# Patient Record
Sex: FEMALE | Race: WHITE | Employment: UNEMPLOYED | URBAN - METROPOLITAN AREA
[De-identification: names, ages, dates, MRNs, and addresses within clinical notes are randomized per-mention and may not be internally consistent; named-entity substitution may affect disease eponyms.]

---

## 2018-08-20 ENCOUNTER — HOSPITAL ENCOUNTER (EMERGENCY)
Age: 58
Discharge: HOME | End: 2018-08-20
Attending: EMERGENCY MEDICINE
Payer: SELF-PAY

## 2018-08-20 VITALS
RESPIRATION RATE: 16 BRPM | OXYGEN SATURATION: 99 % | HEIGHT: 62 IN | SYSTOLIC BLOOD PRESSURE: 136 MMHG | HEART RATE: 94 BPM | WEIGHT: 153 LBS | TEMPERATURE: 98 F | DIASTOLIC BLOOD PRESSURE: 73 MMHG | BODY MASS INDEX: 28.16 KG/M2

## 2018-08-20 DIAGNOSIS — S61.213A LACERATION OF LEFT MIDDLE FINGER WITHOUT FOREIGN BODY WITHOUT DAMAGE TO NAIL, INITIAL ENCOUNTER: Primary | ICD-10-CM

## 2018-08-20 DIAGNOSIS — Z23 NEED FOR TDAP VACCINATION: ICD-10-CM

## 2018-08-20 PROCEDURE — 77030008323 HC SPLNT FNGR GTR DJOR -A

## 2018-08-20 PROCEDURE — 77030039266 HC ADH SKN EXOFIN S2SG -A

## 2018-08-20 PROCEDURE — 99283 EMERGENCY DEPT VISIT LOW MDM: CPT

## 2018-08-20 PROCEDURE — 74011250636 HC RX REV CODE- 250/636: Performed by: PHYSICIAN ASSISTANT

## 2018-08-20 PROCEDURE — 90715 TDAP VACCINE 7 YRS/> IM: CPT | Performed by: PHYSICIAN ASSISTANT

## 2018-08-20 PROCEDURE — 75810000293 HC SIMP/SUPERF WND  RPR

## 2018-08-20 RX ADMIN — TETANUS TOXOID, REDUCED DIPHTHERIA TOXOID AND ACELLULAR PERTUSSIS VACCINE, ADSORBED 0.5 ML: 5; 2.5; 8; 8; 2.5 SUSPENSION INTRAMUSCULAR at 17:43

## 2018-08-20 NOTE — ED PROVIDER NOTES
EMERGENCY DEPARTMENT HISTORY AND PHYSICAL EXAM    Date: 8/20/2018  Patient Name: Dottie Miller    History of Presenting Illness     Chief Complaint   Patient presents with    Laceration         History Provided By: Patient    Chief Complaint: Laceration  Duration: 30 Minutes  Timing:  Acute  Location: Left middle finger  Modifying Factors: No relieving or worsening factors  Associated Symptoms: Controlled bleeding    Additional History (Context):   5:06 PM  Dottie Miller is a 60 y.o. female with PMHX of asthma, DM, thyroid disease, and arthritis who presents to the emergency department C/O laceration to her left middle finger, onset just PTA while cutting fabric at home. Associated sxs include controlled bleeding. Last Tetanus was over 5 years ago. Pt denies numbness, and any other sxs or complaints. PCP: Lauro Meredith MD    Current Outpatient Prescriptions   Medication Sig Dispense Refill    glipiZIDE (GLUCOTROL) 10 mg tablet Take 10 mg by mouth daily.  levothyroxine (SYNTHROID) 100 mcg tablet Take 100 mcg by mouth Daily (before breakfast). Pt instructed to take with a small bit of water on DOS       lisinopril (PRINIVIL, ZESTRIL) 10 mg tablet Take 10 mg by mouth daily. Pt instructed to take with a small bit of water on DOS       PARoxetine (PAXIL) 20 mg tablet Take 20 mg by mouth daily. Indications: GENERALIZED ANXIETY DISORDER      simvastatin (ZOCOR) 40 mg tablet Take 40 mg by mouth nightly.  cloNIDine (CATAPRESS) 0.2 mg tablet Take 0.2 mg by mouth nightly.  insulin (HUMULIN 70/30) 100 unit/mL (70-30) injection 25 Units by SubCUTAneous route every morning. 30 units sq q evening after dinner.  aspirin delayed-release 81 mg tablet Take 81 mg by mouth daily.  vitamin c-vitamin e (CRANBERRY CONCENTRATE) Cap Take 3,600 mg by mouth daily.  vitamin E (AQUA GEMS) 400 unit capsule Take 800 Units by mouth daily.         vitamin E (AQUA GEMS) 400 unit capsule Take 400 Units by mouth nightly.  omega-3 fatty acids-vitamin e (FISH OIL) 1,000 mg Cap Take 2 Caps by mouth two (2) times a day.  albuterol (PROVENTIL HFA, VENTOLIN HFA) 90 mcg/actuation inhaler Take 2 Puffs by inhalation every four (4) hours as needed. Past History     Past Medical History:  Past Medical History:   Diagnosis Date    Arthritis     Asthma     Diabetes ([address]     Thyroid disease        Past Surgical History:  Past Surgical History:   Procedure Laterality Date    HX  SECTION      HX CHOLECYSTECTOMY      HX HYSTERECTOMY      JAMAL & BSO    HX ORTHOPAEDIC  2012    Right CTR    HX PELVIC LAPAROSCOPY      Lysis of Adhesions    HX TUBAL LIGATION         Family History:  History reviewed. No pertinent family history. Social History:  Social History   Substance Use Topics    Smoking status: Former Smoker     Quit date: 2007    Smokeless tobacco: None    Alcohol use No       Allergies: Allergies   Allergen Reactions    Adhesive Tape-Silicones Other (comments)     welps    Niacin Other (comments)     Flushing      Vicodin [Hydrocodone-Acetaminophen] Itching         Review of Systems   Review of Systems   Musculoskeletal: Negative for arthralgias and joint swelling. Skin: Positive for wound (Laceration to left middle finger). Neurological: Negative for weakness and numbness. Hematological: Does not bruise/bleed easily. All other systems reviewed and are negative. Physical Exam     Vitals:    18 1655   BP: 136/73   Pulse: 94   Resp: 16   Temp: 98 °F (36.7 °C)   SpO2: 99%   Weight: 69.4 kg (153 lb)   Height: 5' 2\" (1.575 m)     Physical Exam   Constitutional: She is oriented to person, place, and time. She appears well-developed and well-nourished. No distress.  female in NAD. Alert. Appears comfortable. HENT:   Head: Normocephalic and atraumatic.    Right Ear: External ear normal.   Left Ear: External ear normal.   Nose: Nose normal.   Eyes: Conjunctivae are normal.   Neck: Normal range of motion. Cardiovascular: Normal rate, regular rhythm, normal heart sounds and intact distal pulses. Exam reveals no gallop and no friction rub. No murmur heard. Pulses:       Radial pulses are 2+ on the right side, and 2+ on the left side. Pulmonary/Chest: Effort normal and breath sounds normal. No accessory muscle usage. No tachypnea. She has no decreased breath sounds. She has no rhonchi. Musculoskeletal: Normal range of motion. Left hand: She exhibits laceration. She exhibits normal range of motion, no tenderness, normal capillary refill and no swelling. Normal sensation noted. Normal strength noted. Hands:  Neurological: She is alert and oriented to person, place, and time. Skin: Skin is warm and dry. No rash noted. She is not diaphoretic. No erythema. Psychiatric: She has a normal mood and affect. Judgment normal.   Nursing note and vitals reviewed. Diagnostic Study Results     Labs -   No results found for this or any previous visit (from the past 12 hour(s)). Radiologic Studies -   No orders to display     CT Results  (Last 48 hours)    None        CXR Results  (Last 48 hours)    None          Medications given in the ED-  Medications   diph,Pertuss(AC),Tet Vac-PF (BOOSTRIX) suspension 0.5 mL (0.5 mL IntraMUSCular Given 8/20/18 1743)         Medical Decision Making   I am the first provider for this patient. I reviewed the vital signs, available nursing notes, past medical history, past surgical history, family history and social history. Vital Signs-Reviewed the patient's vital signs. Pulse Oximetry Analysis - 99% on RA     Records Reviewed: Nursing Notes and Old Medical Records    Provider Notes (Medical Decision Making): superficial lac. Appropriate for dermabond. NVI.      Procedures:  Wound Closure by Adhesive  Date/Time: 8/20/2018 5:45 PM  Performed by: Tc Clifton by: Patric Bill [address]     Consent:     Consent obtained:  Verbal    Consent given by:  Patient    Risks discussed:  Pain    Alternatives discussed:  No treatment  Anesthesia (see MAR for exact dosages): Anesthesia method:  None  Laceration details:     Location:  Finger    Finger location:  L long finger    Length (cm):  0.5    Laceration depth: superficial.  Repair type:     Repair type:  Simple  Exploration:     Hemostasis achieved with:  Direct pressure    Wound exploration: wound explored through full range of motion      Contaminated: no    Treatment:     Area cleansed with:  Saline    Amount of cleaning:  Standard    Irrigation solution:  Sterile saline    Irrigation method:  Pressure wash    Visualized foreign bodies/material removed: no    Skin repair:     Repair method:  Tissue adhesive  Approximation:     Approximation:  Close    Vermilion border: well-aligned    Post-procedure details:     Dressing:  Splint for protection    Patient tolerance of procedure: Tolerated well, no immediate complications        ED Course:   5:06 PM Initial assessment performed. The patients presenting problems have been discussed, and they are in agreement with the care plan formulated and outlined with them. I have encouraged them to ask questions as they arise throughout their visit. Diagnosis and Disposition     Cleaned. Dermabond. Tdap updated. Placed in splint to protect area. NVI. Reasons to RTED discussed with pt. All questions answered. Pt feels comfortable going home at this time. Pt expressed understanding and she agrees with plan. DISCHARGE NOTE:  5:40 PM  Milad Nolasco  results have been reviewed with her. She has been counseled regarding her diagnosis, treatment, and plan. She verbally conveys understanding and agreement of the signs, symptoms, diagnosis, treatment and prognosis and additionally agrees to follow up as discussed.   She also agrees with the care-plan and conveys that all of her questions have been answered. I have also provided discharge instructions for her that include: educational information regarding their diagnosis and treatment, and list of reasons why they would want to return to the ED prior to their follow-up appointment, should her condition change. She has been provided with education for proper emergency department utilization. CLINICAL IMPRESSION:    1. Laceration of left middle finger without foreign body without damage to nail, initial encounter    2. Need for Tdap vaccination        PLAN:  1. D/C Home  2. Discharge Medication List as of 8/20/2018  5:40 PM        3. Follow-up Information     Follow up With Details Comments [address] [place] [address] MD Serjio Schedule an appointment as soon as possible for a visit in 2 days For PCP follow up [facility] [address] [id number] [facility] EMERGENCY DEPT Go to As needed, If symptoms worsen [address] [address]  [phone]        _______________________________    Attestations: This note is prepared by Drew Ware, acting as Scribe for Denisa Mcrae PA-C. Denisa Mcrae PA-C:  The scribe's documentation has been prepared under my direction and personally reviewed by me in its entirety.   I confirm that the note above accurately reflects all work, treatment, procedures, and medical decision making performed by me.  _______________________________

## 2018-08-20 NOTE — DISCHARGE INSTRUCTIONS
Cuts: Care Instructions  Your Care Instructions  A cut can happen anywhere on your body. Stitches, staples, skin adhesives, or pieces of tape called Steri-Strips are sometimes used to keep the edges of a cut together and help it heal. Steri-Strips can be used by themselves or with stitches or staples. Sometimes cuts are left open. If the cut went deep and through the skin, the doctor may have closed the cut in two layers. A deeper layer of stitches brings the deep part of the cut together. These stitches will dissolve and don't need to be removed. The upper layer closure, which could be stitches, staples, Steri-Strips, or adhesive, is what you see on the cut. A cut is often covered by a bandage. The doctor has checked you carefully, but problems can develop later. If you notice any problems or new symptoms, get medical treatment right away. Follow-up care is a key part of your treatment and safety. Be sure to make and go to all appointments, and call your doctor if you are having problems. It's also a good idea to know your test results and keep a list of the medicines you take. How can you care for yourself at home? If a cut is open or closed  · Prop up the sore area on a pillow anytime you sit or lie down during the next 3 days. Try to keep it above the level of your heart. This will help reduce swelling. · Keep the cut dry for the first 24 to 48 hours. After this, you can shower if your doctor okays it. Pat the cut dry. · Don't soak the cut, such as in a bathtub. Your doctor will tell you when it's safe to get the cut wet. · After the first 24 to 48 hours, clean the cut with soap and water 2 times a day unless your doctor gives you different instructions. ¨ Don't use hydrogen peroxide or alcohol, which can slow healing. ¨ You may cover the cut with a thin layer of petroleum jelly and a nonstick bandage.   ¨ If the doctor put a bandage over the cut, put on a new bandage after cleaning the cut or if the bandage gets wet or dirty. · Avoid any activity that could cause your cut to reopen. · Be safe with medicines. Read and follow all instructions on the label. ¨ If the doctor gave you a prescription medicine for pain, take it as prescribed. ¨ If you are not taking a prescription pain medicine, ask your doctor if you can take an over-the-counter medicine. If the cut is closed with stitches, staples, or Steri-Strips  · Follow the above instructions for open or closed cuts. · Do not remove the stitches or staples on your own. Your doctor will tell you when to come back to have the stitches or staples removed. · Leave Steri-Strips on until they fall off. If the cut is closed with a skin adhesive  · Follow the above instructions for open or closed cuts. · Leave the skin adhesive on your skin until it falls off on its own. This may take 5 to 10 days. · Do not scratch, rub, or pick at the adhesive. · Do not put the sticky part of a bandage directly on the adhesive. · Do not put any kind of ointment, cream, or lotion over the area. This can make the adhesive fall off too soon. Do not use hydrogen peroxide or alcohol, which can slow healing. When should you call for help? Call your doctor now or seek immediate medical care if:    · You have new pain, or your pain gets worse.     · The skin near the cut is cold or pale or changes color.     · You have tingling, weakness, or numbness near the cut.     · The cut starts to bleed, and blood soaks through the bandage. Oozing small amounts of blood is normal.     · You have trouble moving the area near the cut.     · You have symptoms of infection, such as:  ¨ Increased pain, swelling, warmth, or redness around the cut. ¨ Red streaks leading from the cut. ¨ Pus draining from the cut. ¨ A fever.    Watch closely for changes in your health, and be sure to contact your doctor if:    · The cut reopens.     · You do not get better as expected.    Where can you learn more? Go to http://olu-brianna.info/. Enter M735 in the search box to learn more about \"Cuts: Care Instructions. \"  Current as of: November 20, 2017  Content Version: 11.7  © 9851-8361 [employer]. Care instructions adapted under license by [employer] (which disclaims liability or warranty for this information). If you have questions about a medical condition or this instruction, always ask your healthcare professional. [address] any warranty or liability for your use of this information.

## 2020-08-27 ENCOUNTER — HOSPITAL ENCOUNTER (OUTPATIENT)
Dept: PREADMISSION TESTING | Age: 60
Discharge: HOME | End: 2020-08-27
Payer: MEDICAID

## 2020-08-27 ENCOUNTER — HOSPITAL ENCOUNTER (OUTPATIENT)
Dept: LAB | Age: 60
Discharge: HOME | End: 2020-08-27
Payer: MEDICAID

## 2020-08-27 LAB — XX-LABCORP SPECIMEN COL,LCBCF: NORMAL

## 2020-08-27 PROCEDURE — 99001 SPECIMEN HANDLING PT-LAB: CPT

## 2020-08-27 PROCEDURE — 93005 ELECTROCARDIOGRAM TRACING: CPT

## 2020-08-29 LAB
ATRIAL RATE: 101 BPM
CALCULATED P AXIS, ECG09: 66 DEGREES
CALCULATED R AXIS, ECG10: 39 DEGREES
CALCULATED T AXIS, ECG11: 60 DEGREES
DIAGNOSIS, 93000: NORMAL
P-R INTERVAL, ECG05: 148 MS
Q-T INTERVAL, ECG07: 374 MS
QRS DURATION, ECG06: 120 MS
QTC CALCULATION (BEZET), ECG08: 484 MS
VENTRICULAR RATE, ECG03: 101 BPM

## 2020-09-08 ENCOUNTER — HOSPITAL ENCOUNTER (OUTPATIENT)
Dept: PREADMISSION TESTING | Age: 60
Discharge: HOME | End: 2020-09-08
Payer: MEDICAID

## 2020-09-08 PROCEDURE — 87635 SARS-COV-2 COVID-19 AMP PRB: CPT

## 2020-09-09 LAB — SARS-COV-2, COV2NT: NOT DETECTED

## 2020-09-14 ENCOUNTER — ANESTHESIA EVENT (OUTPATIENT)
Dept: SURGERY | Age: 60
End: 2020-09-14
Payer: MEDICAID

## 2020-09-14 ENCOUNTER — APPOINTMENT (OUTPATIENT)
Dept: GENERAL RADIOLOGY | Age: 60
End: 2020-09-14
Attending: ORTHOPAEDIC SURGERY
Payer: MEDICAID

## 2020-09-14 ENCOUNTER — ANESTHESIA (OUTPATIENT)
Dept: SURGERY | Age: 60
End: 2020-09-14
Payer: MEDICAID

## 2020-09-14 ENCOUNTER — HOSPITAL ENCOUNTER (OUTPATIENT)
Age: 60
Setting detail: OBSERVATION
LOS: 1 days | Discharge: HOME | End: 2020-09-14
Attending: ORTHOPAEDIC SURGERY | Admitting: ORTHOPAEDIC SURGERY
Payer: MEDICAID

## 2020-09-14 VITALS
HEART RATE: 106 BPM | WEIGHT: 160.25 LBS | OXYGEN SATURATION: 90 % | DIASTOLIC BLOOD PRESSURE: 58 MMHG | RESPIRATION RATE: 16 BRPM | TEMPERATURE: 98.6 F | SYSTOLIC BLOOD PRESSURE: 126 MMHG | BODY MASS INDEX: 31.46 KG/M2 | HEIGHT: 60 IN

## 2020-09-14 DIAGNOSIS — M54.16 LUMBAR RADICULOPATHY: Primary | ICD-10-CM

## 2020-09-14 LAB
GLUCOSE BLD STRIP.AUTO-MCNC: 167 MG/DL (ref 70–110)
GLUCOSE BLD STRIP.AUTO-MCNC: 191 MG/DL (ref 70–110)
GLUCOSE BLD STRIP.AUTO-MCNC: 351 MG/DL (ref 70–110)

## 2020-09-14 PROCEDURE — 76210000016 HC OR PH I REC 1 TO 1.5 HR: Performed by: ORTHOPAEDIC SURGERY

## 2020-09-14 PROCEDURE — 77030034479 HC ADH SKN CLSR PRINEO J&J -B: Performed by: ORTHOPAEDIC SURGERY

## 2020-09-14 PROCEDURE — 82962 GLUCOSE BLOOD TEST: CPT

## 2020-09-14 PROCEDURE — 97116 GAIT TRAINING THERAPY: CPT

## 2020-09-14 PROCEDURE — 2709999900 HC NON-CHARGEABLE SUPPLY: Performed by: ORTHOPAEDIC SURGERY

## 2020-09-14 PROCEDURE — 77030031139 HC SUT VCRL2 J&J -A: Performed by: ORTHOPAEDIC SURGERY

## 2020-09-14 PROCEDURE — 74011636637 HC RX REV CODE- 636/637: Performed by: ORTHOPAEDIC SURGERY

## 2020-09-14 PROCEDURE — 76060000036 HC ANESTHESIA 2.5 TO 3 HR: Performed by: ORTHOPAEDIC SURGERY

## 2020-09-14 PROCEDURE — 74011250637 HC RX REV CODE- 250/637: Performed by: ORTHOPAEDIC SURGERY

## 2020-09-14 PROCEDURE — 77030020782 HC GWN BAIR PAWS FLX 3M -B: Performed by: ORTHOPAEDIC SURGERY

## 2020-09-14 PROCEDURE — 77030008477 HC STYL SATN SLP COVD -A: Performed by: STUDENT IN AN ORGANIZED HEALTH CARE EDUCATION/TRAINING PROGRAM

## 2020-09-14 PROCEDURE — 77030014647 HC SEAL FBRN TISSL BAXT -D: Performed by: ORTHOPAEDIC SURGERY

## 2020-09-14 PROCEDURE — 97535 SELF CARE MNGMENT TRAINING: CPT

## 2020-09-14 PROCEDURE — 77030003666 HC NDL SPINAL BD -A: Performed by: ORTHOPAEDIC SURGERY

## 2020-09-14 PROCEDURE — 77010033678 HC OXYGEN DAILY

## 2020-09-14 PROCEDURE — 99218 HC RM OBSERVATION: CPT

## 2020-09-14 PROCEDURE — 97530 THERAPEUTIC ACTIVITIES: CPT

## 2020-09-14 PROCEDURE — 76010000132 HC OR TIME 2.5 TO 3 HR: Performed by: ORTHOPAEDIC SURGERY

## 2020-09-14 PROCEDURE — 74011250636 HC RX REV CODE- 250/636: Performed by: NURSE ANESTHETIST, CERTIFIED REGISTERED

## 2020-09-14 PROCEDURE — 74011636637 HC RX REV CODE- 636/637: Performed by: STUDENT IN AN ORGANIZED HEALTH CARE EDUCATION/TRAINING PROGRAM

## 2020-09-14 PROCEDURE — 77030028271 HC SRGFL HEMSTAT MTRX KT J&J -C: Performed by: ORTHOPAEDIC SURGERY

## 2020-09-14 PROCEDURE — 77030008462 HC STPLR SKN PROX J&J -A: Performed by: ORTHOPAEDIC SURGERY

## 2020-09-14 PROCEDURE — 77030040361 HC SLV COMPR DVT MDII -B: Performed by: ORTHOPAEDIC SURGERY

## 2020-09-14 PROCEDURE — 74011250636 HC RX REV CODE- 250/636: Performed by: ORTHOPAEDIC SURGERY

## 2020-09-14 PROCEDURE — 77030016670 HC BUR RND3 STRY -B: Performed by: ORTHOPAEDIC SURGERY

## 2020-09-14 PROCEDURE — 77030020010 HC FCPS BPLR DISP INLC -E: Performed by: ORTHOPAEDIC SURGERY

## 2020-09-14 PROCEDURE — 77030002933 HC SUT MCRYL J&J -A: Performed by: ORTHOPAEDIC SURGERY

## 2020-09-14 PROCEDURE — 77030018673: Performed by: ORTHOPAEDIC SURGERY

## 2020-09-14 PROCEDURE — 74011000250 HC RX REV CODE- 250: Performed by: ORTHOPAEDIC SURGERY

## 2020-09-14 PROCEDURE — 97162 PT EVAL MOD COMPLEX 30 MIN: CPT

## 2020-09-14 PROCEDURE — 77030004435 HC BUR RND STRY -C: Performed by: ORTHOPAEDIC SURGERY

## 2020-09-14 PROCEDURE — 74011250636 HC RX REV CODE- 250/636: Performed by: STUDENT IN AN ORGANIZED HEALTH CARE EDUCATION/TRAINING PROGRAM

## 2020-09-14 PROCEDURE — 77030020407 HC IV BLD WRMR ST 3M -A: Performed by: STUDENT IN AN ORGANIZED HEALTH CARE EDUCATION/TRAINING PROGRAM

## 2020-09-14 PROCEDURE — 74011000258 HC RX REV CODE- 258: Performed by: NURSE ANESTHETIST, CERTIFIED REGISTERED

## 2020-09-14 PROCEDURE — 97166 OT EVAL MOD COMPLEX 45 MIN: CPT

## 2020-09-14 PROCEDURE — 74011000250 HC RX REV CODE- 250: Performed by: NURSE ANESTHETIST, CERTIFIED REGISTERED

## 2020-09-14 PROCEDURE — 77030008683 HC TU ET CUF COVD -A: Performed by: STUDENT IN AN ORGANIZED HEALTH CARE EDUCATION/TRAINING PROGRAM

## 2020-09-14 RX ORDER — SODIUM CHLORIDE 0.9 % (FLUSH) 0.9 %
5-40 SYRINGE (ML) INJECTION AS NEEDED
Status: DISCONTINUED | OUTPATIENT
Start: 2020-09-14 | End: 2020-09-14 | Stop reason: HOSPADM

## 2020-09-14 RX ORDER — FENOFIBRATE 145 MG/1
145 TABLET, COATED ORAL DAILY
Status: DISCONTINUED | OUTPATIENT
Start: 2020-09-15 | End: 2020-09-14 | Stop reason: HOSPADM

## 2020-09-14 RX ORDER — DEXMEDETOMIDINE HYDROCHLORIDE 100 UG/ML
INJECTION, SOLUTION INTRAVENOUS AS NEEDED
Status: DISCONTINUED | OUTPATIENT
Start: 2020-09-14 | End: 2020-09-14 | Stop reason: HOSPADM

## 2020-09-14 RX ORDER — SODIUM CHLORIDE, SODIUM LACTATE, POTASSIUM CHLORIDE, CALCIUM CHLORIDE 600; 310; 30; 20 MG/100ML; MG/100ML; MG/100ML; MG/100ML
125 INJECTION, SOLUTION INTRAVENOUS CONTINUOUS
Status: DISCONTINUED | OUTPATIENT
Start: 2020-09-14 | End: 2020-09-14 | Stop reason: HOSPADM

## 2020-09-14 RX ORDER — GLIPIZIDE 5 MG/1
5 TABLET, FILM COATED, EXTENDED RELEASE ORAL 2 TIMES DAILY
Status: DISCONTINUED | OUTPATIENT
Start: 2020-09-14 | End: 2020-09-14 | Stop reason: ALTCHOICE

## 2020-09-14 RX ORDER — PANTOPRAZOLE SODIUM 40 MG/1
40 TABLET, DELAYED RELEASE ORAL DAILY
Status: DISCONTINUED | OUTPATIENT
Start: 2020-09-15 | End: 2020-09-14 | Stop reason: HOSPADM

## 2020-09-14 RX ORDER — NALOXONE HYDROCHLORIDE 0.4 MG/ML
0.04 INJECTION, SOLUTION INTRAMUSCULAR; INTRAVENOUS; SUBCUTANEOUS
Status: DISCONTINUED | OUTPATIENT
Start: 2020-09-14 | End: 2020-09-14 | Stop reason: HOSPADM

## 2020-09-14 RX ORDER — CYCLOBENZAPRINE HCL 10 MG
5 TABLET ORAL
Status: DISCONTINUED | OUTPATIENT
Start: 2020-09-14 | End: 2020-09-14 | Stop reason: HOSPADM

## 2020-09-14 RX ORDER — INSULIN LISPRO 100 [IU]/ML
0.05 INJECTION, SOLUTION INTRAVENOUS; SUBCUTANEOUS
Status: DISCONTINUED | OUTPATIENT
Start: 2020-09-14 | End: 2020-09-14 | Stop reason: SDUPTHER

## 2020-09-14 RX ORDER — INSULIN LISPRO 100 [IU]/ML
INJECTION, SOLUTION INTRAVENOUS; SUBCUTANEOUS ONCE
Status: COMPLETED | OUTPATIENT
Start: 2020-09-14 | End: 2020-09-14

## 2020-09-14 RX ORDER — PROPOFOL 10 MG/ML
INJECTION, EMULSION INTRAVENOUS AS NEEDED
Status: DISCONTINUED | OUTPATIENT
Start: 2020-09-14 | End: 2020-09-14 | Stop reason: HOSPADM

## 2020-09-14 RX ORDER — LIDOCAINE HYDROCHLORIDE 20 MG/ML
INJECTION, SOLUTION EPIDURAL; INFILTRATION; INTRACAUDAL; PERINEURAL AS NEEDED
Status: DISCONTINUED | OUTPATIENT
Start: 2020-09-14 | End: 2020-09-14 | Stop reason: HOSPADM

## 2020-09-14 RX ORDER — MORPHINE SULFATE 2 MG/ML
2 INJECTION, SOLUTION INTRAMUSCULAR; INTRAVENOUS
Status: DISCONTINUED | OUTPATIENT
Start: 2020-09-14 | End: 2020-09-14 | Stop reason: HOSPADM

## 2020-09-14 RX ORDER — DIPHENHYDRAMINE HCL 25 MG
25 CAPSULE ORAL
Status: DISCONTINUED | OUTPATIENT
Start: 2020-09-14 | End: 2020-09-14 | Stop reason: HOSPADM

## 2020-09-14 RX ORDER — DEXAMETHASONE 4 MG/1
4 TABLET ORAL EVERY 8 HOURS
Status: DISCONTINUED | OUTPATIENT
Start: 2020-09-14 | End: 2020-09-14 | Stop reason: HOSPADM

## 2020-09-14 RX ORDER — METFORMIN HYDROCHLORIDE 500 MG/1
1000 TABLET ORAL 2 TIMES DAILY WITH MEALS
Status: DISCONTINUED | OUTPATIENT
Start: 2020-09-14 | End: 2020-09-14 | Stop reason: ALTCHOICE

## 2020-09-14 RX ORDER — GABAPENTIN 300 MG/1
300 CAPSULE ORAL 2 TIMES DAILY
Status: DISCONTINUED | OUTPATIENT
Start: 2020-09-14 | End: 2020-09-14 | Stop reason: HOSPADM

## 2020-09-14 RX ORDER — LEVOTHYROXINE SODIUM 100 UG/1
100 TABLET ORAL
Status: DISCONTINUED | OUTPATIENT
Start: 2020-09-15 | End: 2020-09-14 | Stop reason: HOSPADM

## 2020-09-14 RX ORDER — FENTANYL CITRATE 50 UG/ML
INJECTION, SOLUTION INTRAMUSCULAR; INTRAVENOUS AS NEEDED
Status: DISCONTINUED | OUTPATIENT
Start: 2020-09-14 | End: 2020-09-14 | Stop reason: HOSPADM

## 2020-09-14 RX ORDER — INSULIN LISPRO 100 [IU]/ML
INJECTION, SOLUTION INTRAVENOUS; SUBCUTANEOUS
Status: DISCONTINUED | OUTPATIENT
Start: 2020-09-14 | End: 2020-09-14 | Stop reason: HOSPADM

## 2020-09-14 RX ORDER — SODIUM CHLORIDE, SODIUM LACTATE, POTASSIUM CHLORIDE, CALCIUM CHLORIDE 600; 310; 30; 20 MG/100ML; MG/100ML; MG/100ML; MG/100ML
50 INJECTION, SOLUTION INTRAVENOUS CONTINUOUS
Status: DISCONTINUED | OUTPATIENT
Start: 2020-09-14 | End: 2020-09-14 | Stop reason: HOSPADM

## 2020-09-14 RX ORDER — NALBUPHINE HYDROCHLORIDE 10 MG/ML
5 INJECTION, SOLUTION INTRAMUSCULAR; INTRAVENOUS; SUBCUTANEOUS
Status: DISCONTINUED | OUTPATIENT
Start: 2020-09-14 | End: 2020-09-14 | Stop reason: HOSPADM

## 2020-09-14 RX ORDER — CLONIDINE HYDROCHLORIDE 0.1 MG/1
0.2 TABLET ORAL
Status: DISCONTINUED | OUTPATIENT
Start: 2020-09-14 | End: 2020-09-14 | Stop reason: HOSPADM

## 2020-09-14 RX ORDER — DOCUSATE SODIUM 100 MG/1
100 CAPSULE, LIQUID FILLED ORAL 2 TIMES DAILY
Status: DISCONTINUED | OUTPATIENT
Start: 2020-09-14 | End: 2020-09-14 | Stop reason: HOSPADM

## 2020-09-14 RX ORDER — HYDROMORPHONE HYDROCHLORIDE 2 MG/ML
0.5 INJECTION, SOLUTION INTRAMUSCULAR; INTRAVENOUS; SUBCUTANEOUS AS NEEDED
Status: DISCONTINUED | OUTPATIENT
Start: 2020-09-14 | End: 2020-09-14 | Stop reason: HOSPADM

## 2020-09-14 RX ORDER — CEFAZOLIN SODIUM/WATER 2 G/20 ML
2 SYRINGE (ML) INTRAVENOUS ONCE
Status: COMPLETED | OUTPATIENT
Start: 2020-09-14 | End: 2020-09-14

## 2020-09-14 RX ORDER — GLYCOPYRROLATE 0.2 MG/ML
INJECTION INTRAMUSCULAR; INTRAVENOUS AS NEEDED
Status: DISCONTINUED | OUTPATIENT
Start: 2020-09-14 | End: 2020-09-14 | Stop reason: HOSPADM

## 2020-09-14 RX ORDER — DEXAMETHASONE SODIUM PHOSPHATE 4 MG/ML
INJECTION, SOLUTION INTRA-ARTICULAR; INTRALESIONAL; INTRAMUSCULAR; INTRAVENOUS; SOFT TISSUE AS NEEDED
Status: DISCONTINUED | OUTPATIENT
Start: 2020-09-14 | End: 2020-09-14 | Stop reason: HOSPADM

## 2020-09-14 RX ORDER — ROCURONIUM BROMIDE 10 MG/ML
INJECTION, SOLUTION INTRAVENOUS AS NEEDED
Status: DISCONTINUED | OUTPATIENT
Start: 2020-09-14 | End: 2020-09-14 | Stop reason: HOSPADM

## 2020-09-14 RX ORDER — NEOSTIGMINE METHYLSULFATE 1 MG/ML
INJECTION, SOLUTION INTRAVENOUS AS NEEDED
Status: DISCONTINUED | OUTPATIENT
Start: 2020-09-14 | End: 2020-09-14 | Stop reason: HOSPADM

## 2020-09-14 RX ORDER — OXYCODONE HYDROCHLORIDE 5 MG/1
5 TABLET ORAL EVERY 4 HOURS
Status: DISCONTINUED | OUTPATIENT
Start: 2020-09-14 | End: 2020-09-14 | Stop reason: HOSPADM

## 2020-09-14 RX ORDER — SODIUM CHLORIDE 0.9 % (FLUSH) 0.9 %
5-40 SYRINGE (ML) INJECTION EVERY 8 HOURS
Status: DISCONTINUED | OUTPATIENT
Start: 2020-09-14 | End: 2020-09-14 | Stop reason: HOSPADM

## 2020-09-14 RX ORDER — ACETAMINOPHEN 325 MG/1
650 TABLET ORAL EVERY 6 HOURS
Qty: 168 TAB | Refills: 0 | Status: SHIPPED | OUTPATIENT
Start: 2020-09-14 | End: 2020-10-05

## 2020-09-14 RX ORDER — ARIPIPRAZOLE 2 MG/1
2 TABLET ORAL
Status: DISCONTINUED | OUTPATIENT
Start: 2020-09-14 | End: 2020-09-14 | Stop reason: HOSPADM

## 2020-09-14 RX ORDER — DIPHENHYDRAMINE HYDROCHLORIDE 50 MG/ML
12.5 INJECTION, SOLUTION INTRAMUSCULAR; INTRAVENOUS
Status: DISCONTINUED | OUTPATIENT
Start: 2020-09-14 | End: 2020-09-14 | Stop reason: HOSPADM

## 2020-09-14 RX ORDER — MIDAZOLAM HYDROCHLORIDE 1 MG/ML
INJECTION, SOLUTION INTRAMUSCULAR; INTRAVENOUS AS NEEDED
Status: DISCONTINUED | OUTPATIENT
Start: 2020-09-14 | End: 2020-09-14 | Stop reason: HOSPADM

## 2020-09-14 RX ORDER — AMLODIPINE BESYLATE 5 MG/1
5 TABLET ORAL DAILY
Status: DISCONTINUED | OUTPATIENT
Start: 2020-09-15 | End: 2020-09-14 | Stop reason: HOSPADM

## 2020-09-14 RX ORDER — METOCLOPRAMIDE HYDROCHLORIDE 5 MG/ML
INJECTION INTRAMUSCULAR; INTRAVENOUS AS NEEDED
Status: DISCONTINUED | OUTPATIENT
Start: 2020-09-14 | End: 2020-09-14 | Stop reason: HOSPADM

## 2020-09-14 RX ORDER — ONDANSETRON 2 MG/ML
4 INJECTION INTRAMUSCULAR; INTRAVENOUS
Status: DISCONTINUED | OUTPATIENT
Start: 2020-09-14 | End: 2020-09-14 | Stop reason: HOSPADM

## 2020-09-14 RX ORDER — MAGNESIUM SULFATE 100 %
4 CRYSTALS MISCELLANEOUS AS NEEDED
Status: DISCONTINUED | OUTPATIENT
Start: 2020-09-14 | End: 2020-09-14 | Stop reason: HOSPADM

## 2020-09-14 RX ORDER — ACETAMINOPHEN 325 MG/1
650 TABLET ORAL
Status: DISCONTINUED | OUTPATIENT
Start: 2020-09-14 | End: 2020-09-14 | Stop reason: HOSPADM

## 2020-09-14 RX ORDER — ONDANSETRON 2 MG/ML
INJECTION INTRAMUSCULAR; INTRAVENOUS AS NEEDED
Status: DISCONTINUED | OUTPATIENT
Start: 2020-09-14 | End: 2020-09-14 | Stop reason: HOSPADM

## 2020-09-14 RX ORDER — ESMOLOL HYDROCHLORIDE 10 MG/ML
INJECTION INTRAVENOUS AS NEEDED
Status: DISCONTINUED | OUTPATIENT
Start: 2020-09-14 | End: 2020-09-14 | Stop reason: HOSPADM

## 2020-09-14 RX ORDER — LIDOCAINE HYDROCHLORIDE AND EPINEPHRINE 10; 10 MG/ML; UG/ML
INJECTION, SOLUTION INFILTRATION; PERINEURAL AS NEEDED
Status: DISCONTINUED | OUTPATIENT
Start: 2020-09-14 | End: 2020-09-14 | Stop reason: HOSPADM

## 2020-09-14 RX ORDER — HYDROXYZINE 25 MG/1
25 TABLET, FILM COATED ORAL
Status: DISCONTINUED | OUTPATIENT
Start: 2020-09-14 | End: 2020-09-14 | Stop reason: HOSPADM

## 2020-09-14 RX ORDER — ZOLPIDEM TARTRATE 5 MG/1
5 TABLET ORAL
Status: DISCONTINUED | OUTPATIENT
Start: 2020-09-14 | End: 2020-09-14 | Stop reason: HOSPADM

## 2020-09-14 RX ORDER — HYDROMORPHONE HYDROCHLORIDE 2 MG/ML
INJECTION, SOLUTION INTRAMUSCULAR; INTRAVENOUS; SUBCUTANEOUS AS NEEDED
Status: DISCONTINUED | OUTPATIENT
Start: 2020-09-14 | End: 2020-09-14 | Stop reason: HOSPADM

## 2020-09-14 RX ORDER — SODIUM CHLORIDE, SODIUM LACTATE, POTASSIUM CHLORIDE, CALCIUM CHLORIDE 600; 310; 30; 20 MG/100ML; MG/100ML; MG/100ML; MG/100ML
INJECTION, SOLUTION INTRAVENOUS
Status: DISCONTINUED | OUTPATIENT
Start: 2020-09-14 | End: 2020-09-14 | Stop reason: HOSPADM

## 2020-09-14 RX ORDER — NALOXONE HYDROCHLORIDE 0.4 MG/ML
0.4 INJECTION, SOLUTION INTRAMUSCULAR; INTRAVENOUS; SUBCUTANEOUS AS NEEDED
Status: DISCONTINUED | OUTPATIENT
Start: 2020-09-14 | End: 2020-09-14 | Stop reason: HOSPADM

## 2020-09-14 RX ORDER — FENTANYL CITRATE 50 UG/ML
50 INJECTION, SOLUTION INTRAMUSCULAR; INTRAVENOUS
Status: DISCONTINUED | OUTPATIENT
Start: 2020-09-14 | End: 2020-09-14 | Stop reason: HOSPADM

## 2020-09-14 RX ORDER — INSULIN GLARGINE 100 [IU]/ML
0.2 INJECTION, SOLUTION SUBCUTANEOUS
Status: DISCONTINUED | OUTPATIENT
Start: 2020-09-14 | End: 2020-09-14 | Stop reason: HOSPADM

## 2020-09-14 RX ORDER — VANCOMYCIN HYDROCHLORIDE 1 G/20ML
INJECTION, POWDER, LYOPHILIZED, FOR SOLUTION INTRAVENOUS AS NEEDED
Status: DISCONTINUED | OUTPATIENT
Start: 2020-09-14 | End: 2020-09-14 | Stop reason: HOSPADM

## 2020-09-14 RX ORDER — ONDANSETRON 8 MG/1
4 TABLET, ORALLY DISINTEGRATING ORAL
Qty: 30 TAB | Refills: 0 | Status: SHIPPED | OUTPATIENT
Start: 2020-09-14 | End: 2022-05-10

## 2020-09-14 RX ORDER — OXYCODONE HYDROCHLORIDE 5 MG/1
5 TABLET ORAL
Qty: 28 TAB | Refills: 0 | Status: SHIPPED | OUTPATIENT
Start: 2020-09-14 | End: 2020-09-21

## 2020-09-14 RX ORDER — VENLAFAXINE HYDROCHLORIDE 150 MG/1
150 CAPSULE, EXTENDED RELEASE ORAL DAILY
COMMUNITY

## 2020-09-14 RX ORDER — VENLAFAXINE HYDROCHLORIDE 75 MG/1
150 CAPSULE, EXTENDED RELEASE ORAL DAILY
Status: DISCONTINUED | OUTPATIENT
Start: 2020-09-15 | End: 2020-09-14 | Stop reason: HOSPADM

## 2020-09-14 RX ORDER — ALBUTEROL SULFATE 0.83 MG/ML
2.5 SOLUTION RESPIRATORY (INHALATION)
Status: DISCONTINUED | OUTPATIENT
Start: 2020-09-14 | End: 2020-09-14 | Stop reason: HOSPADM

## 2020-09-14 RX ORDER — CEFAZOLIN SODIUM/WATER 2 G/20 ML
2 SYRINGE (ML) INTRAVENOUS EVERY 8 HOURS
Status: DISCONTINUED | OUTPATIENT
Start: 2020-09-14 | End: 2020-09-14 | Stop reason: HOSPADM

## 2020-09-14 RX ORDER — ERGOCALCIFEROL 1.25 MG/1
50000 CAPSULE ORAL
Status: DISCONTINUED | OUTPATIENT
Start: 2020-09-14 | End: 2020-09-14 | Stop reason: HOSPADM

## 2020-09-14 RX ORDER — DEXTROSE MONOHYDRATE 100 MG/ML
125-250 INJECTION, SOLUTION INTRAVENOUS AS NEEDED
Status: DISCONTINUED | OUTPATIENT
Start: 2020-09-14 | End: 2020-09-14 | Stop reason: HOSPADM

## 2020-09-14 RX ADMIN — SODIUM CHLORIDE, SODIUM LACTATE, POTASSIUM CHLORIDE, AND CALCIUM CHLORIDE 125 ML/HR: 600; 310; 30; 20 INJECTION, SOLUTION INTRAVENOUS at 10:34

## 2020-09-14 RX ADMIN — INSULIN LISPRO 10 UNITS: 100 INJECTION, SOLUTION INTRAVENOUS; SUBCUTANEOUS at 16:10

## 2020-09-14 RX ADMIN — TRANEXAMIC ACID 1 G: 100 INJECTION, SOLUTION INTRAVENOUS at 07:58

## 2020-09-14 RX ADMIN — PROPOFOL 150 MG: 10 INJECTION, EMULSION INTRAVENOUS at 07:32

## 2020-09-14 RX ADMIN — FENTANYL CITRATE 100 MCG: 50 INJECTION, SOLUTION INTRAMUSCULAR; INTRAVENOUS at 07:32

## 2020-09-14 RX ADMIN — PHENYLEPHRINE HYDROCHLORIDE 100 MCG: 10 INJECTION INTRAVENOUS at 07:57

## 2020-09-14 RX ADMIN — ESMOLOL HYDROCHLORIDE 30 MG: 10 INJECTION, SOLUTION INTRAVENOUS at 10:00

## 2020-09-14 RX ADMIN — METOCLOPRAMIDE 10 MG: 5 INJECTION, SOLUTION INTRAMUSCULAR; INTRAVENOUS at 07:26

## 2020-09-14 RX ADMIN — ONDANSETRON HYDROCHLORIDE 4 MG: 2 INJECTION INTRAMUSCULAR; INTRAVENOUS at 07:26

## 2020-09-14 RX ADMIN — HYDROMORPHONE HYDROCHLORIDE 0.5 MG: 2 INJECTION INTRAMUSCULAR; INTRAVENOUS; SUBCUTANEOUS at 10:34

## 2020-09-14 RX ADMIN — HYDROMORPHONE HYDROCHLORIDE 0.5 MG: 2 INJECTION, SOLUTION INTRAMUSCULAR; INTRAVENOUS; SUBCUTANEOUS at 09:07

## 2020-09-14 RX ADMIN — HYDROMORPHONE HYDROCHLORIDE 0.5 MG: 2 INJECTION INTRAMUSCULAR; INTRAVENOUS; SUBCUTANEOUS at 10:18

## 2020-09-14 RX ADMIN — DEXMEDETOMIDINE HYDROCHLORIDE 8 MCG: 100 INJECTION, SOLUTION INTRAVENOUS at 09:23

## 2020-09-14 RX ADMIN — Medication 3 MG: at 09:56

## 2020-09-14 RX ADMIN — Medication 2 G: at 15:29

## 2020-09-14 RX ADMIN — HYDROMORPHONE HYDROCHLORIDE 0.5 MG: 2 INJECTION, SOLUTION INTRAMUSCULAR; INTRAVENOUS; SUBCUTANEOUS at 08:51

## 2020-09-14 RX ADMIN — GABAPENTIN 300 MG: 300 CAPSULE ORAL at 13:00

## 2020-09-14 RX ADMIN — GLYCOPYRROLATE 0.4 MG: 0.2 INJECTION INTRAMUSCULAR; INTRAVENOUS at 09:56

## 2020-09-14 RX ADMIN — OXYCODONE HYDROCHLORIDE 5 MG: 5 TABLET ORAL at 15:42

## 2020-09-14 RX ADMIN — DOCUSATE SODIUM 100 MG: 100 CAPSULE, LIQUID FILLED ORAL at 12:59

## 2020-09-14 RX ADMIN — DEXMEDETOMIDINE HYDROCHLORIDE 8 MCG: 100 INJECTION, SOLUTION INTRAVENOUS at 09:54

## 2020-09-14 RX ADMIN — SODIUM CHLORIDE, SODIUM LACTATE, POTASSIUM CHLORIDE, AND CALCIUM CHLORIDE: 600; 310; 30; 20 INJECTION, SOLUTION INTRAVENOUS at 07:26

## 2020-09-14 RX ADMIN — DEXAMETHASONE 4 MG: 4 TABLET ORAL at 13:49

## 2020-09-14 RX ADMIN — INSULIN LISPRO 3 UNITS: 100 INJECTION, SOLUTION INTRAVENOUS; SUBCUTANEOUS at 06:46

## 2020-09-14 RX ADMIN — INSULIN LISPRO 3 UNITS: 100 INJECTION, SOLUTION INTRAVENOUS; SUBCUTANEOUS at 10:39

## 2020-09-14 RX ADMIN — ROCURONIUM BROMIDE 50 MG: 10 INJECTION, SOLUTION INTRAVENOUS at 07:33

## 2020-09-14 RX ADMIN — PHENYLEPHRINE HYDROCHLORIDE 100 MCG: 10 INJECTION INTRAVENOUS at 08:44

## 2020-09-14 RX ADMIN — SODIUM CHLORIDE, SODIUM LACTATE, POTASSIUM CHLORIDE, AND CALCIUM CHLORIDE: 600; 310; 30; 20 INJECTION, SOLUTION INTRAVENOUS at 07:54

## 2020-09-14 RX ADMIN — Medication 2 G: at 07:34

## 2020-09-14 RX ADMIN — PHENYLEPHRINE HYDROCHLORIDE 100 MCG: 10 INJECTION INTRAVENOUS at 08:03

## 2020-09-14 RX ADMIN — OXYCODONE HYDROCHLORIDE 5 MG: 5 TABLET ORAL at 13:00

## 2020-09-14 RX ADMIN — LIDOCAINE HYDROCHLORIDE 60 MG: 20 INJECTION, SOLUTION EPIDURAL; INFILTRATION; INTRACAUDAL; PERINEURAL at 07:32

## 2020-09-14 RX ADMIN — SODIUM CHLORIDE, SODIUM LACTATE, POTASSIUM CHLORIDE, AND CALCIUM CHLORIDE 125 ML/HR: 600; 310; 30; 20 INJECTION, SOLUTION INTRAVENOUS at 06:26

## 2020-09-14 RX ADMIN — DEXAMETHASONE SODIUM PHOSPHATE 4 MG: 4 INJECTION, SOLUTION INTRAMUSCULAR; INTRAVENOUS at 07:43

## 2020-09-14 RX ADMIN — MIDAZOLAM 2 MG: 1 INJECTION INTRAMUSCULAR; INTRAVENOUS at 07:26

## 2020-09-14 NOTE — ANESTHESIA POSTPROCEDURE EVALUATION
Post-Anesthesia Evaluation and Assessment    Cardiovascular Function/Vital Signs  Visit Vitals  /65   Pulse 90   Temp 37 °C (98.6 °F)   Resp 16   Ht 5' (1.524 m)   Wt 72.7 kg (160 lb 4 oz)   SpO2 95%   BMI 31.30 kg/m²       Patient is status post Procedure(s):  LUMBAR FOUR- SACRAL ONE LAMINECTOMY AND WITH C-ARM. Nausea/Vomiting: Controlled. Postoperative hydration reviewed and adequate. Pain:  Pain Scale 1: Numeric (0 - 10) (09/14/20 1212)  Pain Intensity 1: 10 (09/14/20 1212)   Managed. Neurological Status:   Neuro (WDL): Exceptions to WDL (09/14/20 0649)   At baseline. Mental Status and Level of Consciousness: Baseline and appropriate for discharge. Pulmonary Status:   O2 Device: Nasal cannula (09/14/20 1212)   Adequate oxygenation and airway patent. Complications related to anesthesia: None    Post-anesthesia assessment completed. No concerns. Patient has met all discharge requirements.     Signed By: Annia Wynn MD    September 14, 2020

## 2020-09-14 NOTE — INTERVAL H&P NOTE
Update History & Physical    The Patient's History and Physical was reviewed with the patient and I examined the patient. There was no change. The surgical site was confirmed by the patient and me. Plan:  The risk, benefits, expected outcome, and alternative to the recommended procedure have been discussed with the patient. Patient understands and wants to proceed with the procedure.     Electronically signed by Grant De La Rosa MD on 9/14/2020 at 7:09 AM

## 2020-09-14 NOTE — ROUTINE PROCESS
1212  TRANSFER - IN REPORT:    Verbal report received from AAYUSH Calhoun RN(name) on Kadi Carver  being received from [facility]) for routine post - op      Report consisted of patients Situation, Background, Assessment and   Recommendations(SBAR). Information from the following report(s) SBAR, Kardex and MAR was reviewed with the receiving nurse. Opportunity for questions and clarification was provided. Assessment completed upon patients arrival to unit and care assumed.

## 2020-09-14 NOTE — PROGRESS NOTES
1214  Received client from PACU in satisfactory condition. Client is a pt of Dr. Antionette Canavan. Pt had a L4-S1  Laminectomy  today. Client is A/O X 4. Client is calm and cooperative. Clients PERRLA. Pt has  Pt has  No Numbness to right thigh but hurts on right thigh when moving. Radial,Posterior Tibial and Dorsalis Pedis pulses. Capillary Refill less than 3 seconds. Skin is warm , dry and skin color is appropriate to race. Client is negative for JVD. Bibasilar breath sounds clear. No use of accessory muscles. Bowel sounds hypoactive to all quadrants. Abdomen is soft and non-tender. Client has not voided post-operatively. No bladder distention evident. No complaints of bladder discomfort. Client has a Gauze and Ioban dressing to the lower back. Dressing is dry and intact,  No other skin integrity issues present. Sequential compression device applied. Client has RH 18 gauge PIV present and running LR at 125 ml/hr. Clients pain is 7/10 on 0-10 scale. Client oriented to call bell use as well as bed use. Client oriented to phone and how to order meals. Call bell within reach. Bed in low position. Three side rails up. Armbands/ fall risk band intact. Dual skin check done with Katalina Meng RN. No skin breakdown noted. 1211   Pt voided 200 ml of clear yellow urine. 1302   Pt medicated with oxycodone 5 mg po for pain level  8/10.    1330   Pt was seen by Dr Valentine Miller. Pt was seen by Diabetic specialist.  0828   Pt was seen by OT. Pt ambulated and voided in BR then back in bed.   1557  Pt ambulated in hallway and stairs with PT/OT. Pt got dressed up on her clothes. Accucheck  351 but has eaten about an hr ago. Will cover with 10 units Humalog as per Diabetic specialist. Hemovac drain with 65 ml of serosanguinous d/c.  Dr Valentine Miller is aware. Pt to go home with HVAC and will go to the office on Wednesday. 1730   INT removed. D/C instructions given. Dual AVS reviewed with NEIL Portillo RN.   All medications reviewed individually with patient. Opportunities for questions and concerns provided. Patient's arm band appropriately discarded. 1800   Pt d/speedy per wheelchair accompanied by .

## 2020-09-14 NOTE — DISCHARGE SUMMARY
Discharge Summary     Patient: Chata Bryan MRN: [id number]  SSN: [id number]    YOB: 1960  Age: 63 y.o. Sex: female       Admit Date: 9/14/2020    Discharge Date: 9/14/2020      Admission Diagnoses: Lumbar radiculopathy [M54.16]    Discharge Diagnoses:   Problem List as of 9/14/2020 Date Reviewed: 8/14/2012          Codes Class Noted - Resolved    Lumbar radiculopathy ICD-10-CM: M54.16  ICD-9-CM: 724.4  9/14/2020 - Present        Nephrolithiasis ICD-10-CM: N20.0  ICD-9-CM: 592.0  8/14/2012 - Present               Discharge Condition: Good    Hospital Course: SPINE DISCHARGE SUMMARY      Procedure: L4-S1 laminectomy    HPI:  This is a 63y.o. year old female that presented to the office with signs and symptoms of lumbar radiculopathy . They tried and failed conservative treatment measures and wished to proceed with surgical intervention. The risks, benefits, and complications of the procedure were discussed with the patient and informed consent was obtained. Hospital Course: The patient was admitted to the hospital on 9/14/2020 and underwent an uncomplicated B5-W5 laminectomy. They were transferred to the PACU after surgery. Their pain was well managed with IV and oral pain medications. All questions were answered to the patients satisfaction. Patient will be discharge to home today  in good condition. Patient is scheduled for followup with me in 2 weeks. Consults: None    Significant Diagnostic Studies:     Disposition: home    Discharge Medications:   Current Discharge Medication List      START taking these medications    Details   docusate sodium (COLACE) 50 mg capsule Take 1 Cap by mouth two (2) times a day for 90 days. Qty: 60 Cap, Refills: 2      ondansetron (ZOFRAN ODT) 8 mg disintegrating tablet Take 0.5 Tabs by mouth every eight (8) hours as needed for Nausea.   Qty: 30 Tab, Refills: 0      oxyCODONE IR (Roxicodone) 5 mg immediate release tablet Take 1 Tab by mouth every six (6) hours as needed for Pain for up to 7 days. Max Daily Amount: 20 mg.  Qty: 28 Tab, Refills: 0    Associated Diagnoses: Lumbar radiculopathy      acetaminophen (TylenoL) 325 mg tablet Take 2 Tabs by mouth every six (6) hours for 21 days. Qty: 168 Tab, Refills: 0         CONTINUE these medications which have NOT CHANGED    Details   venlafaxine-SR (Effexor XR) 150 mg capsule Take 150 mg by mouth daily. gabapentin (NEURONTIN) 300 mg capsule Take 300 mg by mouth two (2) times a day. metFORMIN (GLUCOPHAGE) 1,000 mg tablet Take 1,000 mg by mouth two (2) times daily (with meals). amLODIPine (NORVASC) 5 mg tablet Take 5 mg by mouth daily. glipiZIDE SR (GLUCOTROL XL) 5 mg CR tablet Take 5 mg by mouth two (2) times a day. fenofibrate nanocrystallized (TRICOR) 145 mg tablet Take 145 mg by mouth daily. hydrOXYzine HCL (ATARAX) 25 mg tablet Take 25 mg by mouth nightly. zolpidem (Ambien) 5 mg tablet Take 5 mg by mouth nightly. ARIPiprazole (ABILIFY) 2 mg tablet Take 2 mg by mouth nightly.      ergocalciferol (Vitamin D2) 1,250 mcg (50,000 unit) capsule Take 50,000 Units by mouth every Monday. fluticasone propionate (Flovent HFA) 44 mcg/actuation inhaler Take 2 Puffs by inhalation two (2) times a day. insulin aspart protamine/insulin aspart (NovoLOG Mix 70-30 U-100 Insuln) 100 unit/mL (70-30) injection 75 Units by SubCUTAneous route two (2) times a day. levothyroxine (SYNTHROID) 100 mcg tablet Take 100 mcg by mouth Daily (before breakfast). simvastatin (ZOCOR) 40 mg tablet Take 40 mg by mouth nightly. cloNIDine (CATAPRESS) 0.2 mg tablet Take 0.2 mg by mouth nightly. albuterol (PROVENTIL HFA, VENTOLIN HFA) 90 mcg/actuation inhaler Take 2 Puffs by inhalation every four (4) hours as needed.                Activity: Activity as tolerated and no driving for today  Diet: Diabetic Diet  Wound Care: Keep wound clean and dry    Follow-up Appointments   Procedures    FOLLOW UP VISIT Appointment in: Two Weeks     Standing Status:   Standing     Number of Occurrences:   1     Order Specific Question:   Appointment in     Answer:    Two Weeks       Signed By: Zion Mason MD     September 14, 2020

## 2020-09-14 NOTE — ANESTHESIA PREPROCEDURE EVALUATION
Relevant Problems   No relevant active problems       Anesthetic History   No history of anesthetic complications     Pertinent negatives: No PONV       Review of Systems / Medical History  Patient summary reviewed, nursing notes reviewed and pertinent labs reviewed    Pulmonary            Asthma   Pertinent negatives: No COPD and sleep apnea     Neuro/Psych         Psychiatric history  Pertinent negatives: No seizures and CVA   Cardiovascular    Hypertension            Pertinent negatives: No past MI and CHF  Exercise tolerance: >4 METS     GI/Hepatic/Renal  Within defined limits           Pertinent negatives: No liver disease and renal disease   Endo/Other    Diabetes  Hypothyroidism  Arthritis     Other Findings              Physical Exam    Airway  Mallampati: II  TM Distance: 4 - 6 cm  Neck ROM: normal range of motion   Mouth opening: Normal     Cardiovascular  Regular rate and rhythm,  S1 and S2 normal,  no murmur, click, rub, or gallop  Rhythm: regular  Rate: normal         Dental  No notable dental hx       Pulmonary  Breath sounds clear to auscultation               Abdominal  GI exam deferred       Other Findings            Anesthetic Plan    ASA: 3  Anesthesia type: general          Induction: Intravenous  Anesthetic plan and risks discussed with: Patient

## 2020-09-14 NOTE — OP NOTES
Operative Report    Patient: Monique Ace MRN: [id number]  SSN: [id number]    YOB: 1960  Age: 63 y.o. Sex: female       Date of Surgery: 9/14/2020     Preoperative Diagnosis: INTERVERTEBRAL DISC DISORDER WITH RADICULOPATHY     Postoperative Diagnosis: INTERVERTEBRAL DISC DISORDER WITH RADICULOPATHY     Surgeon(s) and Role:     Tierra Vaughan MD - Primary    Anesthesia: General     Procedure: Procedure(s):  LUMBAR FOUR- SACRAL ONE LAMINECTOMY AND WITH C-ARM     Procedure in Detail: After sterile preparation of the surgical field a timeout was conducted to ensure correct surgery, site and patient. Following a 10 blade was used to make skin incision over the surgical side which was identified by skin anatomy. Bovie was then used to bring incision down to fascia. Once fascia was manually cleared, Bovie was then used again to bring exposure down the lamina of the vertebrae and out to the Pars interarticularis. Identification of the spinal elements were conducted simultaneously. Following, using Kocher's the correct levels were confirmed using fluoroscopic guidance. After confirming correct level attention was turned to decompression. The Decompression extended from L4 to S1 and was accomplished by using landy and manual tools. Decompression was assessed using manual manipulation of the central dura and foramen to ensure wide decompression with any remaining stenosis at any level. Following this a valsalva was performed and the dura was checked for leaks, No leaks or active bleeding was seen. Wound was then irrigated with Betadine followed by 1L of NS. Next a 12F Hemovac was placed and sutured in. Closure consisted of #1 vicryl  interrupted suture followed by another #1 vicryl running suture for the fascia. Following the subQ layer was closed with 2.0 vicryl interrupted. Finally skin was closed with 4.0 Monocryl running. After dressing consisted of prineo gauze and ioband.  The patient did well the entire case and I was present for all portions of the case. Estimated Blood Loss:  100cc    Tourniquet Time: * No tourniquets in log *      Implants: * No implants in log *            Specimens: * No specimens in log *        Drains: None                Complications: None    Counts: Sponge and needle counts were correct times two.     Signed By:  Karthik Driver MD     September 14, 2020

## 2020-09-14 NOTE — PERIOP NOTES
TRANSFER - OUT REPORT:    Verbal report given to Magdaleno Montague (name) on Inés Neither  being transferred to 0) for routine post - op       Report consisted of patients Situation, Background, Assessment and   Recommendations(SBAR). Information from the following report(s) SBAR, OR Summary, Intake/Output, MAR and Cardiac Rhythm NSR was reviewed with the receiving nurse. Lines:   Peripheral IV 09/14/20 Posterior;Right Hand (Active)   Site Assessment Clean, dry, & intact 09/14/20 1053   Phlebitis Assessment 0 09/14/20 1053   Infiltration Assessment 0 09/14/20 1053   Dressing Status Clean, dry, & intact 09/14/20 1053   Dressing Type Transparent 09/14/20 1053   Hub Color/Line Status Infusing 09/14/20 1053   Alcohol Cap Used No 09/14/20 0630        Opportunity for questions and clarification was provided.       Patient transported with:   O2 @ 2 liters       Intake/Output Summary (Last 24 hours) at 9/14/2020 1153  Last data filed at 9/14/2020 1053  Gross per 24 hour   Intake 2230 ml   Output 300 ml   Net 1930 ml

## 2020-09-14 NOTE — PERIOP NOTES
Verbal hand off at the bedside with Wilfrid Colon RN. Family member waiting in patients room. Plastic bag with patients personal item brought over with patient.

## 2020-09-14 NOTE — DISCHARGE INSTRUCTIONS
Patient Discharge Instructions    Rishabh Laurent / [id number] : 1960    Admitted 2020 Discharged: 2020     · It is important that you take the medication exactly as they are prescribed. · Keep your medication in the bottles provided by the pharmacist and keep a list of the medication names, dosages, and times to be taken with you at all times. · Do not take other medications without consulting your doctor. What to do at Home    Discharge Instruction - SPINE      Weight Bearing Status:  Full weight bearing  DVT prophylaxis:  DO NOT take blood thinners until cleared by surgeon  Pain:  Continue analgesics as directed  Showering Instructions:  Do not shower until 3 days after surgery. After first shower remove the top dressing (yellow) and leave the under dressing (purple glue with mesh)  Dressing Instructions:  Keep surgical incision clean and dry at all times. No soaking or scrubbing of wound at any time. Driving Instructions:  No driving until cleared by Physician. PT/OT:  Begin PT immediately upon discharge as scheduled preoperatively  Appt Instructions: Follow-up with surgeon as scheduled preoperatively. If you are unsure of the date or time of your next visit please call office. Contact the office sooner if you experience any increased numbness/tingling in the extremities. Miscellaneous:  No Bending at waste  No lifting greater than 5 lbs. No Twisting at waste  No strenuous exercise. Recommended Diet: Diabetic Diet    Recommended Activity: Activity as tolerated and no driving for today    If you experience any of the following symptoms worsening leg pain numbness, please follow up with .     Signed By: Cliff Nj MD     2020

## 2020-09-14 NOTE — PROGRESS NOTES
Transition of Care ([facility]) Plan:    Chart reviewed and spoke with pt at bedside, when discharged pt would like to return back home with spouse, declines home health services if recommended, pt has home walking cane and rolling walker if needed, cm will remain available until pt leaves if needed. [facility] Transportation:   How is patient being transported at discharge? Family/Friend      When? Once discharge criteria met     Is transport scheduled? N/A      Follow-up appointment and transportation:   PCP/Specialist?  See AVS for Appointment         Who is transporting to the follow-up appointment? Family/Friend      Is transport for follow up appointment scheduled? N/A    Communication plan (with patient/family): Who is being called? Patient or Next of Kin? Responsible party? Patient      What number(s) is to be used? See Facesheet      What service provider is calling for [facility] services? When are they calling? 24-48 hours following discharge    Readmission Risk? (Green/Low; Yellow/Moderate; Red/High):  Green  Care Management Interventions  PCP Verified by CM: Yes  Palliative Care Criteria Met (RRAT>21 & CHF Dx)?: No  Mode of Transport at Discharge:  Other (see comment)(spouse)  Physical Therapy Consult: Yes  Occupational Therapy Consult: Yes  Current Support Network: Lives with Spouse  Confirm Follow Up Transport: Family  The Plan for Transition of Care is Related to the Following Treatment Goals : plans to return back home with spouse  Discharge Location  Discharge Placement: Home

## 2020-09-14 NOTE — PROGRESS NOTES
Problem: Mobility Impaired (Adult and Pediatric)  Goal: *Acute Goals and Plan of Care (Insert Text)  Description: Physical Therapy short term goals, to be achieved in 2 days. Pt will:   1. Perform bed mobility with indep in prep for OOB activity. 2. Perform sit <> stand transfers with RW and indep in prep for functional mobility and ambulation. 3. Ambulate 250 ft with RW and SBA in prep for household and community mobility. 4. Ascend/descend 2 stairs with BL HR and SBA for safe home entry. Note: [x]  Patient has met MD cameron elkins for d/c home  [x]  Recommend HH with 24 hour adult care   []  Benefit from additional acute PT session to address:      PHYSICAL THERAPY EVALUATION    Patient: Priyanka Polo (36 y.o. female)  Date: 9/14/2020  Primary Diagnosis: Lumbar radiculopathy [M54.16]  Procedure(s) (LRB):  LUMBAR FOUR- SACRAL ONE LAMINECTOMY AND WITH C-ARM (N/A) Day of Surgery   Precautions:   Fall, Spinal  PLOF: Pt was independent with mobility with no AD or supplemental O2.    ASSESSMENT :  Based on the objective data described below, the patient presents with decr LE strength and ROM, decr balance, back pain and decr activity tolerance resulting in decr functional mobility secondary to L4-S1 laminectomy surgery. Pt O2 sat at rest on 2L O2 was 94%. Pt instructed in and performed log roll technique for supine <> sit bed mobility, required SBA for supine > sit, and Garret at LEs for sit > supine. Pt performed sit <> stand transfers with CGA and vc for safe use of RW. Pt ambulated in [place] with RW and CGA demonstrating decr B step length and required several short (<5 second) rest breaks due to low back pain (5-6/10). Pt ascended/descended 1 box step x2 reps with RW and SBA. Pt O2 sat after ambulation on room air was 90%, incr to 94% with seated rest break, LUCIANA Lugo informed of O2 sat response to ambulation. Pt educated on benefits of early mobility, spinal precautions, and home safety.  Pt would benefit from additional skilled therapy on discharge in order to incr functional mobility and promote return to PLOF. Rec HHPT on discharge. Patient will benefit from skilled intervention to address the above impairments. Patient's rehabilitation potential is considered to be Good  Factors which may influence rehabilitation potential include:   []         None noted  []         Mental ability/status  []         Medical condition  []         Home/family situation and support systems  []         Safety awareness  [x]         Pain tolerance/management  []         Other:      PLAN :  Recommendations and Planned Interventions:   [x]           Bed Mobility Training             [x]    Neuromuscular Re-Education  [x]           Transfer Training                   []    Orthotic/Prosthetic Training  [x]           Gait Training                          [x]    Modalities  [x]           Therapeutic Exercises           []    Edema Management/Control  [x]           Therapeutic Activities            [x]    Family Training/Education  [x]           Patient Education  []           Other (comment):    Frequency/Duration: Patient will be followed by physical therapy 1-2 times per day/4-7 days per week to address goals. Discharge Recommendations: Home Health  Further Equipment Recommendations for Discharge: N/A     SUBJECTIVE:   Patient stated I really want to go home.     OBJECTIVE DATA SUMMARY:     Past Medical History:   Diagnosis Date    Arthritis     Asthma     Chronic pain     lower back, upper back, left leg     Diabetes ([facility] [address]     Hypertension     Kidney stones     Psychiatric disorder     depression    Thyroid disease      Past Surgical History:   Procedure Laterality Date    HX  SECTION      HX CHOLECYSTECTOMY      HX HYSTERECTOMY      JAMAL & BSO    HX ORTHOPAEDIC  2012    Right CTR    HX PELVIC LAPAROSCOPY      Lysis of Adhesions    HX TUBAL LIGATION       Barriers to Learning/Limitations: yes;  other anesthesia  Compensate with: Visual Cues, Verbal Cues, and Tactile Cues  Home Situation:  Home Situation  Home Environment: Private residence  # Steps to Enter: 2  Rails to Enter: No  One/Two Story Residence: One story  Living Alone: No  Support Systems: Spouse/Significant Other/Partner  Patient Expects to be Discharged to[de-identified] Private residence  Current DME Used/Available at Home: Walker, rolling, Cane, straight  Tub or Shower Type: Tub  Critical Behavior:  Neurologic State: Alert  Orientation Level: Oriented X4  Psychosocial  Patient Behaviors: Cooperative  Family  Behaviors: Supportive  Purposeful Interaction: Yes  Pt Identified Daily Priority: Clinical issues (comment)  Caring Interventions: Reassure  Reassure: Therapeutic listening; Informing  Skin Condition/Temp: Warm  Family  Behaviors: Supportive  Skin Integrity: Incision (comment); Intact  Skin Integumentary  Skin Color: Appropriate for ethnicity  Skin Condition/Temp: Warm  Skin Integrity: Incision (comment); Intact  Turgor: Non-tenting  Strength:    Strength: Generally decreased, functional  Tone & Sensation:   Tone: Normal  Sensation: Intact  Range Of Motion:  AROM: Generally decreased, functional  Functional Mobility:  Bed Mobility:  Rolling: Stand-by assistance(vc)  Supine to Sit: Stand-by assistance(vc)  Sit to Supine: Minimum assistance(with LE)  Transfers:  Sit to Stand: Contact guard assistance; Adaptive equipment(RW, vc)  Stand to Sit: Contact guard assistance; Adaptive equipment(RW, vc)  Balance:   Sitting: Intact  Standing: Intact; With support  Ambulation/Gait Training:  Distance (ft): 100 Feet (ft)  Assistive Device: Walker, rolling  Ambulation - Level of Assistance: Contact guard assistance  Gait Abnormalities: (L toe-out)  Speed/Elvie: Slow  Step Length: Left shortened;Right shortened  Stairs:  Number of Stairs Trained: 1(x2 reps)  Stairs - Level of Assistance: Contact guard assistance  Rail Use: (RW)  Pain:  Pain level pre-treatment: 6/10   Pain level post-treatment: 6/10   Pain Intervention(s) : Medication (see MAR); Rest, Ice, Repositioning  Response to intervention: Nurse notified, See doc flow    Activity Tolerance:   Pt O2 sat 94% at rest, with ambulation on room air decr to 90%. With sitting rest break O2 incr to 94%  Please refer to the flowsheet for vital signs taken during this treatment. After treatment:   []         Patient left in no apparent distress sitting up in chair  [x]         Patient left in no apparent distress in bed  [x]         Call bell left within reach  [x]         Nursing notified  [x]         Caregiver present  []         Bed alarm activated  []         SCDs applied    COMMUNICATION/EDUCATION:   [x]         Role of Physical Therapy in the acute care setting. [x]         Fall prevention education was provided and the patient/caregiver indicated understanding. [x]         Patient/family have participated as able in goal setting and plan of care. [x]         Patient/family agree to work toward stated goals and plan of care. []         Patient understands intent and goals of therapy, but is neutral about his/her participation. []         Patient is unable to participate in goal setting/plan of care: ongoing with therapy staff.  []         Other:     Thank you for this referral.  Anayeli Garcia   Time Calculation: 46 mins      Eval Complexity: History: MEDIUM  Complexity : 1-2 comorbidities / personal factors will impact the outcome/ POC Exam:MEDIUM Complexity : 3 Standardized tests and measures addressing body structure, function, activity limitation and / or participation in recreation  Presentation: MEDIUM Complexity : Evolving with changing characteristics  Clinical Decision Making:Medium Complexity    Overall Complexity:MEDIUM

## 2020-09-14 NOTE — PROGRESS NOTES
Problem: Self Care Deficits Care Plan (Adult)  Goal: *Acute Goals and Plan of Care (Insert Text)  Description: Initial Occupational Therapy Goals (9/14/2020) Within 7 day(s):    1. Patient will perform toilet transfer with supervision in preparation for bowel and bladder management. 2. Patient will perform bowel and bladder management with supervision for increased independence with ADLs. 3. Patient will perform UB dressing with supervision for increased independence with ADLs. 4. Patient will perform LB dressing with supervision & A/E PRN for increased independence with ADLs. 5. Patient will adhere to back/spinal precautions 100% of the time with 1-2 verbal cues for increased independence with ADLs. 6. Patient will utilize energy conservation techniques with 1 verbal cue(s) for increased independence with ADLs. Outcome: Progressing Towards Goal   OCCUPATIONAL THERAPY EVALUATION    Patient: Keely Zabala (22 y.o. female)  Date: 9/14/2020  Primary Diagnosis: Lumbar radiculopathy [M54.16]  Procedure(s) (LRB):  LUMBAR FOUR- SACRAL ONE LAMINECTOMY AND WITH C-ARM (N/A) Day of Surgery   Precautions: Fall, Spinal  PLOF: pt mod I for ADLs PTA, living with spouse    ASSESSMENT AND RECOMMENDATIONS:  Based on the objective data described below, the patient presents with BLE decreased ROM and strength affecting LE ADLs. Vitals assessed, O2 94% at start of session. Pt able to complete log rolling technique with SBA/vc, and sat up to EOB with SBA. Pt completed upper body dressing with SBA. Patient able to utilize BLE ankle-over-knee technique with BLE for sock donning with min A to complete. Pt able to thread B feet through underwear/shorts with min A, and CGA when standing to pull up to waist. Pt CGA/SBA for STS/bathroom mobility/toilet transfer with vc for proper body mechanics. Pt able to void and completed bladder hygiene with supervision.  Pt ambulated back to EOB, provided opportunity for pt to voice questions on ADL performance when home, pt has no further concerns. Patient reports spouse will be available to assist with ADLs and management. Patient will benefit from skilled Occupational Therapy intervention to maximize safety/independence with ADLs at d/c.    Education: Reviewed Back Precautions, LSO/Brace management, body mechanics, home safety, importance of moving every hour to assist w/ stiffness & spasms, adaptive strategies and adaptive dressing techniques including clothing modifications with patient verbalizing understanding at this time. Patient will benefit from skilled intervention to address the above impairments. Patient's rehabilitation potential is considered to be Good  Factors which may influence rehabilitation potential include:   []             None noted  []             Mental ability/status  []             Medical condition  []             Home/family situation and support systems  []             Safety awareness  []             Pain tolerance/management  []             Other:        PLAN :  Recommendations and Planned Interventions:   [x]               Self Care Training                  [x]      Therapeutic Activities  [x]               Functional Mobility Training   []      Cognitive Retraining  [x]               Therapeutic Exercises           [x]      Endurance Activities  [x]               Balance Training                    []      Neuromuscular Re-Education  []               Visual/Perceptual Training     [x]      Home Safety Training  [x]               Patient Education                   []      Family Training/Education  []               Other (comment):    Frequency/Duration: Patient will be followed by occupational therapy 1-2 times per day/4-7 days per week to address goals.   Discharge Recommendations: Home Health with adult supervision for at least 24 hours after d/c  Further Equipment Recommendations for Discharge: N/A     SUBJECTIVE:   Patient stated Tan Sommer had to use the bed david earlier and I made a mess.     OBJECTIVE DATA SUMMARY:     Past Medical History:   Diagnosis Date    Arthritis     Asthma     Chronic pain     lower back, upper back, left leg     Diabetes ([address]     Hypertension     Kidney stones     Psychiatric disorder     depression    Thyroid disease      Past Surgical History:   Procedure Laterality Date    HX  SECTION      HX CHOLECYSTECTOMY      HX HYSTERECTOMY      JAMAL & BSO    HX ORTHOPAEDIC  2012    Right CTR    HX PELVIC LAPAROSCOPY      Lysis of Adhesions    HX TUBAL LIGATION       Barriers to Learning/Limitations: yes;  physical and altered mental status (i.e.Sedation, Confusion)  Compensate with: visual, verbal, tactile, kinesthetic cues/model    Home Situation/Prior Level of Function: pt mod I for ADLs/functional mobility  Home Situation  Home Environment: Private residence  # Steps to Enter: 2  Rails to Enter: No  One/Two Story Residence: One story  Living Alone: No  Support Systems: Spouse/Significant Other/Partner  Patient Expects to be Discharged to[de-identified] Private residence  Current DME Used/Available at Home: Walker, rolling, Verónica Altman, straight  Tub or Shower Type: Tub/Shower combination  []  Right hand dominant   []  Left hand dominant    Cognitive/Behavioral Status:  Neurologic State: Alert  Orientation Level: Oriented X4  Cognition: Appropriate decision making; Appropriate for age attention/concentration; Follows commands  Safety/Judgement: Awareness of environment    Skin: lumbar incision w/ dressing/Mepilex & HemeVAC   Edema: compression hose in place & applied ice     Coordination: BUE  Fine Motor Skills-Upper: Left Intact; Right Intact    Gross Motor Skills-Upper: Left Intact; Right Intact    Balance:  Sitting: Intact  Standing: Intact; With support    Strength: BUE  Strength: Generally decreased, functional    Tone & Sensation:BUE  Tone: Normal  Sensation: Intact    Range of Motion: BUE  AROM: Generally decreased, functional    Functional Mobility and Transfers for ADLs:  Bed Mobility:  Rolling: Stand-by assistance(vc)  Supine to Sit: Stand-by assistance(vc)  Sit to Supine: Minimum assistance     Transfers:  Sit to Stand: Contact guard assistance   Toilet Transfer : Contact guard assistance   Bathroom Mobility: Contact guard assistance    ADL Assessment:  Feeding: Setup    Oral Facial Hygiene/Grooming: Setup    Bathing: Minimum assistance    Upper Body Dressing: Stand-by assistance    Lower Body Dressing: Minimum assistance    Toileting: Contact guard assistance     ADL Intervention:  Upper Body Dressing Assistance  Dressing Assistance: Stand-by assistance  Bra: Contact guard assistance  Pullover Shirt: Stand-by assistance    Lower Body Dressing Assistance  Dressing Assistance: Minimum assistance  Underpants: Minimum assistance  Pants With Elastic Waist: Minimum assistance  Socks: Minimum assistance  Position Performed: Seated edge of bed  Cues: Verbal cues provided;Visual cues provided    Toileting  Toileting Assistance: Contact guard assistance  Bladder Hygiene: Stand-by assistance  Clothing Management: Contact guard assistance    Cognitive Retraining  Safety/Judgement: Awareness of environment    Pain:  Pain level pre-treatment: 7/10  Pain level post-treatment: 5/10  Pain Intervention(s): Medication administer by Nursing (see MAR); Rest, Ice, Repositioning   Response to intervention: Nurse notified, see doc flow sheet    Activity Tolerance:   Fair. Patient able to stand ~5 minute(s). Patient able to complete ADLs with intermittent rest breaks. Patient limited by pain, strength, ROM. Patient unsteady. Please refer to the flowsheet for vital signs taken during this treatment.   After treatment:   []  Patient left in no apparent distress sitting up in chair  []  Patient sitting on EOB  [x]  Patient left in no apparent distress in bed  [x]  Call bell left within reach  [x]  Nursing notified  [x]  Caregiver present  [x]  Ice applied  []  SCD's on while back in bed  [] Bed alarm activated    COMMUNICATION/EDUCATION:   Communication/Collaboration:  [x]       Role of Occupational Therapy in the acute care setting. [x]      Home safety education was provided and the patient/caregiver indicated understanding. [x]      Patient/family have participated as able in goal setting and plan of care. [x]      Patient/family agree to work toward stated goals and plan of care. []      Patient understands intent and goals of therapy, but is neutral about his/her participation. []      Patient is unable to participate in plan of care at this time. Thank you for this referral.  Zane Sandhu OTR/L  Time Calculation: 47 mins    Eval Complexity: History: MEDIUM Complexity : Expanded review of history including physical, cognitive and psychosocial  history ; Examination: LOW Complexity : 1-3 performance deficits relating to physical, cognitive , or psychosocial skils that result in activity limitations and / or participation restrictions ; Decision Making:MEDIUM Complexity : Patient may present with comorbidities that affect occupational performnce.  Miniml to moderate modification of tasks or assistance (eg, physical or verbal ) with assesment(s) is necessary to enable patient to complete evaluation

## 2020-09-14 NOTE — DIABETES MGMT
Diabetes Patient/Family Education Record  Factors That  May Influence Patients Ability  to Learn or  Comply with Recommendations   []   Language barrier    []   Cultural needs   []   Motivation    []   Cognitive limitation    []   Physical   []   Education    []   Physiological factors   []   Hearing/vision/speaking impairment   []   Nondenominational beliefs    []   Financial factors   []  Other:   []  No factors identified at this time.      Person Instructed:   []   Patient   []   Family   []  Other     Preference for Learning:   []   Verbal   []   Written   []  Demonstration     Level of Comprehension & Competence:    []  Good                                      [] Fair                                     []  Poor                             []  Needs Reinforcement   []  Teachback completed    Education Component:   [x]  Medication management, including confirmation of home regimen and last insulin dose    []  Nutritional management -obtain usual meal pattern   []  Exercise   []  Signs, symptoms, and treatment of hyperglycemia and hypoglycemia   [x] Prevention, recognition and treatment of hyperglycemia and hypoglycemia   [x]  Importance of blood glucose monitoring     []  Instruction on use of the blood glucose meter   []  Discuss the importance of HbA1C monitoring    []  Sick day guidelines   []  Proper use and disposal of lancets, needles, syringes or insulin pens (if appropriate)   []  Potential long-term complications (retinopathy, kidney disease, neuropathy, foot care)   [] Information about whom to contact in case of emergency or for more information    [x]  Goal:  Patient/family will demonstrate understanding of Diabetes Self Management Skills by: (date) _______  Plan for post-discharge education or self-management support:    [] Outpatient class schedule provided            [] Patient Declined    [] Scheduled for outpatient classes (date) _______  Verify:  Does patient understand how diabetes medications work? ____________________________  Does patient know what their most recent A1c is? __________________n/a_________________  Does patient monitor glucose at home? __________________yes_________________________  Does patient have difficulty obtaining diabetes medications or testing supplies? _______no__________         Horacio Brizuela MS, RN, CDE  Glycemic Control Team  [phone]  Pager [phone] (- 8:00-4:30P)  *After Hours pager [phone]

## 2020-09-14 NOTE — DIABETES MGMT
GLYCEMIC CONTROL PROGRESS NOTE:    - GC consult received for pre/post operative blood glucose management 70/30/oral home regimen  - known h/o T2DM, HbA1C ordered per protocol  - insulin subq order set initiated, recommend basal/bolus IP regimen   - the following orders were entered with permission from Dr. Mary Posada   *Lantus 15 units at bedtime   *Humalog 4 units qac  - pt told this writer last dose of insulin was at dinner last night 43 units of 70/30, denies hypoglycemia  - do believe pt will experience steroid associated hyperglycemia  - possible discharge later today    Recent Glucose Results:   Lab Results   Component Value Date/Time    GLUCPOC 191 (H) 09/14/2020 10:14 AM    GLUCPOC 167 (H) 09/14/2020 05:50 AM     Anastacia Greer MS, RN, CDE  Glycemic Control Team  [phone]  Pager [phone] (M-TH 8:00-4:30P)  *After Hours pager [phone]

## 2020-09-15 ENCOUNTER — TELEPHONE (OUTPATIENT)
Dept: OTHER | Age: 60
End: 2020-09-15

## 2020-09-15 NOTE — TELEPHONE ENCOUNTER
Delia Talley post spine surgery follow up call. Discussed using ice, distraction, and repositioning to manage pain besides using medication. Reminded patient not to get the wound wet and to cover it before bathing. Reminded patient the importance of doing exercises as shown. Reminded patient to change positions frequently and walking at least 3-4 times each day to promote circulation, decrease stiffness and soreness. Reminded to healthy eat foods along with drinking plenty of fluids to promote healing. Reminded not  to take medication on an empty stomach to prevent nausea. Reminded to take a stool softener while taking a narcotic due to constipation being a side effect of anesthesia and narcotics. Reminded patient to following back precautions: (no bending, lifting or twisting & log rolling to get out of bed). Reminded patient to change positions frequently and walk at least 3-4 times day to promote circulation and decrease stiffness and soreness. Reminded to use ice for pain control and decrease swelling as instructed. Reminded to eat a healthy diet and drink plenty of fluids to promote healing.      Orthopaedic Navigator

## 2021-01-18 ENCOUNTER — TRANSCRIBE ORDER (OUTPATIENT)
Dept: SCHEDULING | Age: 61
End: 2021-01-18

## 2021-01-18 DIAGNOSIS — R10.13 EPIGASTRIC PAIN: Primary | ICD-10-CM

## 2021-01-27 ENCOUNTER — HOSPITAL ENCOUNTER (OUTPATIENT)
Dept: NUCLEAR MEDICINE | Age: 61
Discharge: HOME | End: 2021-01-27
Attending: INTERNAL MEDICINE
Payer: MEDICAID

## 2021-01-27 DIAGNOSIS — R10.13 EPIGASTRIC PAIN: ICD-10-CM

## 2021-01-27 PROCEDURE — A9541 TC99M SULFUR COLLOID: HCPCS

## 2021-04-14 ENCOUNTER — OFFICE VISIT (OUTPATIENT)
Dept: HEMATOLOGY | Age: 61
End: 2021-04-14
Payer: MEDICAID

## 2021-04-14 ENCOUNTER — HOSPITAL ENCOUNTER (OUTPATIENT)
Dept: LAB | Age: 61
Discharge: HOME | End: 2021-04-14

## 2021-04-14 VITALS
TEMPERATURE: 98.6 F | SYSTOLIC BLOOD PRESSURE: 160 MMHG | WEIGHT: 155 LBS | DIASTOLIC BLOOD PRESSURE: 65 MMHG | HEART RATE: 77 BPM | HEIGHT: 62 IN | BODY MASS INDEX: 28.52 KG/M2 | OXYGEN SATURATION: 98 %

## 2021-04-14 DIAGNOSIS — K74.60 HEPATIC CIRRHOSIS, UNSPECIFIED HEPATIC CIRRHOSIS TYPE, UNSPECIFIED WHETHER ASCITES PRESENT (HCC): Primary | ICD-10-CM

## 2021-04-14 PROBLEM — I10 ESSENTIAL HYPERTENSION: Status: ACTIVE | Noted: 2021-04-14

## 2021-04-14 PROBLEM — J45.909 ASTHMA: Status: ACTIVE | Noted: 2021-04-14

## 2021-04-14 PROBLEM — E11.9 CONTROLLED TYPE 2 DIABETES MELLITUS, WITH LONG-TERM CURRENT USE OF INSULIN (HCC): Status: ACTIVE | Noted: 2021-04-14

## 2021-04-14 PROBLEM — E03.9 ACQUIRED HYPOTHYROIDISM: Status: ACTIVE | Noted: 2021-04-14

## 2021-04-14 PROBLEM — Z79.4 CONTROLLED TYPE 2 DIABETES MELLITUS, WITH LONG-TERM CURRENT USE OF INSULIN (HCC): Status: ACTIVE | Noted: 2021-04-14

## 2021-04-14 PROBLEM — K31.84 GASTROPARESIS: Status: ACTIVE | Noted: 2021-04-14

## 2021-04-14 PROBLEM — L93.2 CUTANEOUS LUPUS ERYTHEMATOSUS: Status: ACTIVE | Noted: 2021-04-14

## 2021-04-14 LAB — XX-LABCORP SPECIMEN COL,LCBCF: NORMAL

## 2021-04-14 PROCEDURE — 99205 OFFICE O/P NEW HI 60 MIN: CPT | Performed by: NURSE PRACTITIONER

## 2021-04-14 PROCEDURE — 99001 SPECIMEN HANDLING PT-LAB: CPT

## 2021-04-14 NOTE — PROGRESS NOTES
[address] [facility]      Matthew Ly MD, Sally Boston, Niru Lopez MD, MPH      Rodolfo Zapata, PAAISHA Pedro, ACNP-BC     April IRINA Meek, [facility]   Deborah Wilkins, FNP-C    Cintia Amaya, [facility]       Colby Fallon [facility] 136    at [address], [address] [facility] [place], [facility] 22.    [phone]    FAX: [address] [place] [address] Avenue    [address], [address], [address]    [phone]    FAX: [phone]       Patient Care Team:  Laina Dean MD as PCP - General (Family Medicine)  Stephen Zepeda MD (Gastroenterology)  Lilly Boyle NP (Nurse Practitioner)      Problem List  Date Reviewed: 4/14/2021          Codes Class Noted    Gastroparesis ICD-10-CM: K31.84  ICD-9-CM: 536.3  4/14/2021        Controlled type 2 diabetes mellitus, with long-term current use of insulin ([facility] [address] ICD-10-CM: E11.9, Z79.4  ICD-9-CM: 250.00, V58.67  4/14/2021        Essential hypertension ICD-10-CM: I10  ICD-9-CM: 401.9  4/14/2021        Asthma ICD-10-CM: J45.909  ICD-9-CM: 493.90  4/14/2021        Acquired hypothyroidism ICD-10-CM: E03.9  ICD-9-CM: 244.9  4/14/2021        Cirrhosis of liver without ascites ([facility] [address] ICD-10-CM: K74.60  ICD-9-CM: 571.5  4/14/2021        Cutaneous lupus erythematosus ICD-10-CM: L93.2  ICD-9-CM: 695.4  4/14/2021        Lumbar radiculopathy ICD-10-CM: M54.16  ICD-9-CM: 724.4  9/14/2020        Nephrolithiasis ICD-10-CM: N20.0  ICD-9-CM: 592.0  8/14/2012              The clinicians listed above have asked me to see Yelena Ayala in consultation regarding management of cirrhosis secondary to suspected GUZMAN. All medical records sent by the referring physicians were reviewed including imaging studies. The patient is a 57 y.o.   female who was found to have chronic liver disease and cirrhosis   in 2/2021 when she had a CT scan. An assessment of liver fibrosis with biopsy or elastography has not been performed. Serologic evaluation for markers of chronic liver disease has either not been performed or the results are not available. The patient has not developed any of the major complications of cirrhosis to date. The patient denies having any symptoms which could be attributed to the liver disorder. The patient is not experiencing the following symptoms which are commonly seen in this liver disorder:   fatigue, pain in the right side over the liver, problems concentrating. The patient completes all daily activities without any functional       ASSESSMENT AND PLAN:  Cirrhosis  The diagnosis of cirrhosis is based upon imaging. Cirrhosis is presumed secondary to GUZMAN. The patient has normal liver function. The patient has never developed any complications of cirrhosis to date. The CTP is 5. Child class A. The MELD score is 6. Liver transaminases are elevated. ALP is normal. Liver function is normal. The platelet count is normal.      Have performed laboratory testing to monitor liver function and degree of liver injury. This included   BMP, hepatic panel, CBC with platelet count. Serologic testing for causes of chronic liver disease was ordered. Will perform additional serologic tests to screen for other causes of chronic liver disease. The patient has cirrhosis but does not require liver transplant evaluation testing at this time because   the MELD score is low and there have been no complications of cirrhosis to date. Screening for Esophageal varices   The last EGD to assess for varices was performed in 8/2020. Have requested results be faxed to us from Dr. Aston Burnham office. Hepatic encephalopathy   Overt HE has not developed to date.     There is no need for treatment with lactulose and/or Xifaxan at this time. There is no need to restrict dietary protein at this time. Screening for Hepatocellular Carcinoma  HCC screening has recently been performed and does not suggest [facility] [address] The next liver imaging study will be performed in 8/2021. AFP was ordered today. Treatment of other medical problems in patients with chronic liver disease  The patient has cirrhrosis and should avoid taking NSAIDs which are associated with a higher rate of developing SUDEEP. The patient can take any medications utilized for treatment of DM, statins to treat hypercholesterolemia    Normal doses of acetaminophen, as recommended on the label of the bottle, are not hepatotoxic except in the setting of daily alcohol use, even in patients with cirrhosis and can be utilized for pain. Counseling for alcohol in patients with chronic liver disease  The patient was counseled regarding alcohol consumption and the effect of alcohol on chronic liver disease. The patient has cirrhosis and was advised to be abstinent from all alcohol including non-alcoholic beer which does contain some alcohol. The patient does not consume any significant amount of alcohol. Osteoporosis  The risk of osteoporosis is increased in patients with cirrhosis. DEXA bone density to assess for osteoporosis has not been performed. This should be ordered by the patients primary care physician. Vaccinations   The need for vaccination against viral hepatitis A and B will be assessed with serologic and instituted as appropriate. Routine vaccinations against other bacterial and viral agents can be performed as indicated. Annual flu vaccination should be administered if indicated.       ALLERGIES  Allergies   Allergen Reactions    Lisinopril Angioedema    Adhesive Tape-Silicones Other (comments)     welps    Niacin Other (comments)     Flushing      Vicodin [Hydrocodone-Acetaminophen] Itching       MEDICATIONS  Current Outpatient Medications Medication Sig    venlafaxine-SR (Effexor XR) 150 mg capsule Take 150 mg by mouth daily.  ondansetron (ZOFRAN ODT) 8 mg disintegrating tablet Take 0.5 Tabs by mouth every eight (8) hours as needed for Nausea.  gabapentin (NEURONTIN) 300 mg capsule Take 300 mg by mouth two (2) times a day.  metFORMIN (GLUCOPHAGE) 1,000 mg tablet Take 1,000 mg by mouth two (2) times daily (with meals).  amLODIPine (NORVASC) 5 mg tablet Take 5 mg by mouth daily.  glipiZIDE SR (GLUCOTROL XL) 5 mg CR tablet Take 5 mg by mouth two (2) times a day.  fenofibrate nanocrystallized (TRICOR) 145 mg tablet Take 145 mg by mouth daily.  zolpidem (Ambien) 5 mg tablet Take 5 mg by mouth nightly.  ARIPiprazole (ABILIFY) 2 mg tablet Take 2 mg by mouth nightly.  ergocalciferol (Vitamin D2) 1,250 mcg (50,000 unit) capsule Take 50,000 Units by mouth every Monday.  fluticasone propionate (Flovent HFA) 44 mcg/actuation inhaler Take 2 Puffs by inhalation two (2) times a day.  insulin aspart protamine/insulin aspart (NovoLOG Mix 70-30 U-100 Insuln) 100 unit/mL (70-30) injection 75 Units by SubCUTAneous route two (2) times a day.  levothyroxine (SYNTHROID) 100 mcg tablet Take 100 mcg by mouth Daily (before breakfast).  simvastatin (ZOCOR) 40 mg tablet Take 40 mg by mouth nightly.  cloNIDine (CATAPRESS) 0.2 mg tablet Take 0.2 mg by mouth nightly.  albuterol (PROVENTIL HFA, VENTOLIN HFA) 90 mcg/actuation inhaler Take 2 Puffs by inhalation every four (4) hours as needed.  hydrOXYzine HCL (ATARAX) 25 mg tablet Take 25 mg by mouth nightly. No current facility-administered medications for this visit. SYSTEM REVIEW NOT RELATED TO LIVER DISEASE OR REVIEWED ABOVE:  Constitution systems: Negative for fever, chills, weight gain, weight loss. Eyes: Negative for visual changes. ENT: Negative for sore throat, painful swallowing. Respiratory: Negative for cough, hemoptysis, SOB.    Cardiology: Negative for chest pain, palpitations. GI:  Negative for constipation or diarrhea. : Negative for urinary frequency, dysuria, hematuria, nocturia. Skin: Negative for rash. Hematology: Negative for easy bruising, blood clots. Musculo-skelatal: Negative for back pain, muscle pain, weakness. Neurologic: Negative for headaches, dizziness, vertigo, memory problems not related to HE. Psychology: Negative for anxiety, depression. FAMILY HISTORY:  The father  of COPD. The mother  of heart failure. The following family members have liver disease: father had fatty liver    SOCIAL HISTORY:  The patient is . The patient has 2 children, and 5 grandchildren. The patient stopped using tobacco products in . The patient has never consumed significant amounts of alcohol. The patient does not work. PHYSICAL EXAMINATION:  Visit Vitals  BP (!) 160/65   Pulse 77   Temp 98.6 °F (37 °C) (Tympanic)   Ht 5' 1.5\" (1.562 m)   Wt 155 lb (70.3 kg)   SpO2 98%   BMI 28.81 kg/m²     General: No acute distress. Eyes: Sclera anicteric. ENT: No oral lesions. Thyroid normal.  Nodes: No adenopathy. Skin: No spider angiomata. No jaundice. No palmar erythema. Respiratory: Lungs clear to auscultation. Cardiovascular: Regular heart rate. No murmurs. No JVD. Abdomen: Soft non-tender. Liver size normal to percussion/palpation. Spleen not palpable. No obvious ascites. Extremities: No edema. No muscle wasting. No gross arthritic changes. Neurologic: Alert and oriented. Cranial nerves grossly intact. No asterixis.     LABORATORY STUDIES:  Liver [place] [id number] Units 2021   WBC 3.4 - 10.8 x10E3/uL 5.9   ANC 1.4 - 7.0 x10E3/uL 2.7   HGB 11.1 - 15.9 g/dL 12.3    - 450 x10E3/uL 173   AST 0 - 40 IU/L 53 (H)   ALT 0 - 32 IU/L 42 (H)   Alk Phos 39 - 117 IU/L 65   Bili, Total 0.0 - 1.2 mg/dL <0.2   Bili, Direct 0.00 - 0.40 mg/dL 0.08   Albumin 3.8 - 4.8 g/dL 4.6 BUN 8 - 27 mg/dL 12   Creat 0.57 - 1.00 mg/dL 0.64   Na 134 - 144 mmol/L 139   K 3.5 - 5.2 mmol/L 4.4   Cl 96 - 106 mmol/L 101   CO2 20 - 29 mmol/L 20   Glucose 65 - 99 mg/dL 64 (L)       SEROLOGIES:  Not available or performed. Testing will be performed as indicated. LIVER HISTOLOGY:  Not available or performed    ENDOSCOPIC PROCEDURES:  Not available or performed    RADIOLOGY:  2/2021. CT scan abdomen with IV contrast. Changes consistent with cirrhosis. No liver mass lesions. No dilated bile ducts. No ascites. OTHER TESTING:  Not available or performed    FOLLOW-UP:  All of the issues listed above in the Assessment and Plan were discussed with the patient. All questions were answered. The patient expressed a clear understanding of the above. [address] [address] in 4 weeks for Fibroscan and to review all data and determine the treatment plan.         MADIE Gruber  [address] [place] [address]  [facility], [address] [place] [address] Street - [address]  [phone]

## 2021-04-15 LAB
AFP L3 MFR SERPL: 11.5 % (ref 0–9.9)
AFP SERPL-MCNC: 5.7 NG/ML (ref 0–8)
ALBUMIN SERPL-MCNC: 4.6 G/DL (ref 3.8–4.8)
ALP SERPL-CCNC: 65 IU/L (ref 39–117)
ALT SERPL-CCNC: 42 IU/L (ref 0–32)
AST SERPL-CCNC: 53 IU/L (ref 0–40)
BASOPHILS # BLD AUTO: 0 X10E3/UL (ref 0–0.2)
BASOPHILS NFR BLD AUTO: 0 %
BILIRUB DIRECT SERPL-MCNC: 0.08 MG/DL (ref 0–0.4)
BILIRUB SERPL-MCNC: <0.2 MG/DL (ref 0–1.2)
BUN SERPL-MCNC: 12 MG/DL (ref 8–27)
BUN/CREAT SERPL: 19 (ref 12–28)
CALCIUM SERPL-MCNC: 10.7 MG/DL (ref 8.7–10.3)
CHLORIDE SERPL-SCNC: 101 MMOL/L (ref 96–106)
CO2 SERPL-SCNC: 20 MMOL/L (ref 20–29)
CREAT SERPL-MCNC: 0.64 MG/DL (ref 0.57–1)
EOSINOPHIL # BLD AUTO: 0.1 X10E3/UL (ref 0–0.4)
EOSINOPHIL NFR BLD AUTO: 1 %
ERYTHROCYTE [DISTWIDTH] IN BLOOD BY AUTOMATED COUNT: 13.8 % (ref 11.7–15.4)
FERRITIN SERPL-MCNC: 19 NG/ML (ref 15–150)
GLUCOSE SERPL-MCNC: 64 MG/DL (ref 65–99)
HAV AB SER QL IA: POSITIVE
HBV CORE AB SERPL QL IA: NEGATIVE
HBV SURFACE AB SER QL: NON REACTIVE
HBV SURFACE AG SERPL QL IA: NEGATIVE
HCT VFR BLD AUTO: 38.7 % (ref 34–46.6)
HCV AB S/CO SERPL IA: <0.1 S/CO RATIO (ref 0–0.9)
HCV AB SERPL QL IA: NORMAL
HGB BLD-MCNC: 12.3 G/DL (ref 11.1–15.9)
IMM GRANULOCYTES # BLD AUTO: 0 X10E3/UL (ref 0–0.1)
IMM GRANULOCYTES NFR BLD AUTO: 0 %
IRON SATN MFR SERPL: 8 % (ref 15–55)
IRON SERPL-MCNC: 48 UG/DL (ref 27–139)
LYMPHOCYTES # BLD AUTO: 2.5 X10E3/UL (ref 0.7–3.1)
LYMPHOCYTES NFR BLD AUTO: 42 %
MCH RBC QN AUTO: 27.5 PG (ref 26.6–33)
MCHC RBC AUTO-ENTMCNC: 31.8 G/DL (ref 31.5–35.7)
MCV RBC AUTO: 87 FL (ref 79–97)
MONOCYTES # BLD AUTO: 0.6 X10E3/UL (ref 0.1–0.9)
MONOCYTES NFR BLD AUTO: 10 %
NEUTROPHILS # BLD AUTO: 2.7 X10E3/UL (ref 1.4–7)
NEUTROPHILS NFR BLD AUTO: 47 %
PLATELET # BLD AUTO: 173 X10E3/UL (ref 150–450)
POTASSIUM SERPL-SCNC: 4.4 MMOL/L (ref 3.5–5.2)
PROT SERPL-MCNC: 8 G/DL (ref 6–8.5)
RBC # BLD AUTO: 4.47 X10E6/UL (ref 3.77–5.28)
SODIUM SERPL-SCNC: 139 MMOL/L (ref 134–144)
TIBC SERPL-MCNC: 613 UG/DL (ref 250–450)
UIBC SERPL-MCNC: 565 UG/DL (ref 118–369)
WBC # BLD AUTO: 5.9 X10E3/UL (ref 3.4–10.8)

## 2021-05-17 ENCOUNTER — OFFICE VISIT (OUTPATIENT)
Dept: HEMATOLOGY | Age: 61
End: 2021-05-17
Payer: MEDICAID

## 2021-05-17 VITALS
HEART RATE: 64 BPM | RESPIRATION RATE: 15 BRPM | DIASTOLIC BLOOD PRESSURE: 61 MMHG | HEIGHT: 62 IN | WEIGHT: 162 LBS | OXYGEN SATURATION: 96 % | TEMPERATURE: 97 F | BODY MASS INDEX: 29.81 KG/M2 | SYSTOLIC BLOOD PRESSURE: 130 MMHG

## 2021-05-17 DIAGNOSIS — K74.60 CIRRHOSIS OF LIVER WITHOUT ASCITES, UNSPECIFIED HEPATIC CIRRHOSIS TYPE (HCC): Primary | ICD-10-CM

## 2021-05-17 PROBLEM — K86.89 PANCREATIC INSUFFICIENCY: Status: ACTIVE | Noted: 2021-05-17

## 2021-05-17 PROCEDURE — 99214 OFFICE O/P EST MOD 30 MIN: CPT | Performed by: NURSE PRACTITIONER

## 2021-05-17 PROCEDURE — 91200 LIVER ELASTOGRAPHY: CPT | Performed by: NURSE PRACTITIONER

## 2021-05-17 NOTE — PROGRESS NOTES
[address] [facility]      Ángela Mott MD, Ana Palma, Emilie Nunn MD, MPH      Laron Sewell, PA-STEVEN Dickinson, [facility]     Kelly Meek, [facility]   Cedrick Au, [facility]    Richelle Mason, [facility]       Colby Fallon Philip De Alfaro 136    at [address], [address] [place] [address], [facility] 22.    [phone]    FAX: [address] [place] [address], [address]    [phone]    FAX: [phone]       Patient Care Team:  John Corea MD as PCP - General (Family Medicine)  Taco Doherty MD (Gastroenterology)  Flaca Rhodes NP (Nurse Practitioner)      Problem List  Date Reviewed: 5/17/2021          Codes Class Noted    Pancreatic insufficiency ICD-10-CM: K86.89  ICD-9-CM: 577.8  5/17/2021        Gastroparesis ICD-10-CM: K31.84  ICD-9-CM: 536.3  4/14/2021        Controlled type 2 diabetes mellitus, with long-term current use of insulin ([facility] ICD-10-CM: E11.9, Z79.4  ICD-9-CM: 250.00, V58.67  4/14/2021        Essential hypertension ICD-10-CM: I10  ICD-9-CM: 401.9  4/14/2021        Asthma ICD-10-CM: J45.909  ICD-9-CM: 493.90  4/14/2021        Acquired hypothyroidism ICD-10-CM: E03.9  ICD-9-CM: 244.9  4/14/2021        Cirrhosis of liver without ascites ([facility] ICD-10-CM: K74.60  ICD-9-CM: 571.5  4/14/2021        Cutaneous lupus erythematosus ICD-10-CM: L93.2  ICD-9-CM: 695.4  4/14/2021        Lumbar radiculopathy ICD-10-CM: M54.16  ICD-9-CM: 724.4  9/14/2020        Nephrolithiasis ICD-10-CM: N20.0  ICD-9-CM: 592.0  8/14/2012              Brandon Bangura [facility] management of cirrhosis presumed secondary to   non-alcoholic fatty liver disease (NAFLD).  The active problem list, all pertinent past medical history, medications,   liver histology, radiologic findings, and laboratory findings related to the liver disorder were reviewed and discussed with the patient. The patient is a 57 y.o.  female who was found to have chronic liver disease and cirrhosis in 2/2021 when she had a CT scan. Assessment of liver fibrosis with Fibroscan was performed in the office today. The result was 27.8 kPa which correlates with cirrhosis. The CAP score of 376 suggests hepatic steatosis. Serologic evaluation for markers of chronic liver disease was negative. The patient has not developed any of the major complications of cirrhosis to date. The patient denies having any symptoms which could be attributed to the liver disorder. The patient is not experiencing the following symptoms which are commonly seen in this liver disorder:   fatigue, pain in the right side over the liver, problems concentrating. The patient completes all daily activities without any functional       ASSESSMENT AND PLAN:  Cirrhosis  The diagnosis of cirrhosis is based upon imaging and Fibroscan  Cirrhosis is presumed secondary to GUZMAN. The patient has normal liver function. The patient has never developed any complications of cirrhosis to date. The CTP is 5. Child class A. The MELD score is 6. Liver transaminases are elevated. ALP is normal. Liver function is normal. The platelet count is normal.      Have performed laboratory testing to monitor liver function and degree of liver injury. This included   BMP, hepatic panel, CBC with platelet count. Serologic testing for causes of chronic liver disease was negative. The patient has cirrhosis but does not require liver transplant evaluation testing at this time because   the MELD score is low and there have been no complications of cirrhosis to date.     NAFLD  Suspect the patient has fatty liver based upon   The diagnosis is based upon imaging, Fiboscan CAP score, features of metabolic syndrome, serologic studies that are negative for other causes of chronic liver disease. The histologic severity has not been defined. Elastography performed in 5/2021 suggests Cirrhosis. Only a liver biopsy can confirm if the patient does have fatty liver and differentiate NAFL from GUZMAN. The treatment is the same; weight reduction. If the liver biopsy demonstrates GUZMAN the patient could be eligible for enrollment into clinical trials for treatment of GUZMAN. The need to perform a liver biopsy to help determine the cause and severity of the liver test abnormalities was discussed. The risks of performing the liver biopsy including pain, puncture of the lung, gallbladder, intestine or kidney and bleeding were discussed. The patient has decided to have a liver biopsy. This will be scheduled. If the patient loses 20% of current body weight, which is 32 pounds, down to a weight of 130 pounds, all steatosis will have resolved. Once all steatosis has resolved all inflammation will resolve. Then all fibrosis will gradually resolve and the liver could eventually be normal.    There is currently no FDA approved medical treatment for fatty liver, NALFD or GUZMAN. The only medical treatments for GUZMAN are through clinical trials. The patient would be a good candidate for enrollment into a clinical trial if found to have GUZMAN. Counseling for diet and weight loss in patients with confirmed or suspected NAFLD  The patient was counseled regarding diet and exercise to achieve weight loss. The best diet for patients with fatty liver is one very low in carbohydrates and enriched with protein such as an Park[employer] program.      The patient was told not to consume any food products and drinks containing fructose as this enhances hepatic fat synthesis. There is no medication or vitamin supplements that we advocate for GUZMAN.     Using glitazones in patients without diabetes mellitus has been shown to reduce fat content in the liver but has no effect on fibrosis and is associated with weight gain. Vitamin E has also been used but the data is not very good and most experts no longer advocate this. Screening for Esophageal varices   The patient does not have esophageal or gastric varices. The last EGD to assess for varices was performed in 3/2021. Hepatic encephalopathy   Overt HE has not developed to date. There is no need for treatment with lactulose and/or Xifaxan at this time. There is no need to restrict dietary protein at this time. Screening for Hepatocellular Carcinoma  HCC screening has recently been performed and does not suggest [facility].. The next liver imaging study will be performed in 8/2021. AFP was ordered today. Treatment of other medical problems in patients with chronic liver disease  The patient has cirrhrosis and should avoid taking NSAIDs which are associated with a higher rate of developing SUDEEP. The patient can take any medications utilized for treatment of DM, statins to treat hypercholesterolemia    Normal doses of acetaminophen, as recommended on the label of the bottle, are not hepatotoxic except in the setting of daily alcohol use, even in patients with cirrhosis and can be utilized for pain. Counseling for alcohol in patients with chronic liver disease  The patient was counseled regarding alcohol consumption and the effect of alcohol on chronic liver disease. The patient has cirrhosis and was advised to be abstinent from all alcohol including non-alcoholic beer which does contain some alcohol. The patient does not consume any significant amount of alcohol. Osteoporosis  The risk of osteoporosis is increased in patients with cirrhosis. DEXA bone density to assess for osteoporosis has not been performed. This should be ordered by the patients primary care physician.     Vaccinations   Vaccination for viral hepatitis B is recommended since the patient has no serologic evidence of previous exposure or vaccination with immunity. Vaccination for viral hepatitis A is not needed. The patient has serologic evidence of prior exposure or vaccination with immunity. Routine vaccinations against other bacterial and viral agents can be performed as indicated. Annual flu vaccination should be administered if indicated. ALLERGIES  Allergies   Allergen Reactions    Lisinopril Angioedema    Adhesive Tape-Silicones Other (comments)     welps    Niacin Other (comments)     Flushing      Vicodin [Hydrocodone-Acetaminophen] Itching       MEDICATIONS  Current Outpatient Medications   Medication Sig    venlafaxine-SR (Effexor XR) 150 mg capsule Take 150 mg by mouth daily.  ondansetron (ZOFRAN ODT) 8 mg disintegrating tablet Take 0.5 Tabs by mouth every eight (8) hours as needed for Nausea.  gabapentin (NEURONTIN) 300 mg capsule Take 300 mg by mouth two (2) times a day.  metFORMIN (GLUCOPHAGE) 1,000 mg tablet Take 1,000 mg by mouth two (2) times daily (with meals).  amLODIPine (NORVASC) 5 mg tablet Take 5 mg by mouth daily.  glipiZIDE SR (GLUCOTROL XL) 5 mg CR tablet Take 5 mg by mouth two (2) times a day.  fenofibrate nanocrystallized (TRICOR) 145 mg tablet Take 145 mg by mouth daily.  hydrOXYzine HCL (ATARAX) 25 mg tablet Take 25 mg by mouth nightly.  zolpidem (Ambien) 5 mg tablet Take 5 mg by mouth nightly.  ARIPiprazole (ABILIFY) 2 mg tablet Take 2 mg by mouth nightly.  ergocalciferol (Vitamin D2) 1,250 mcg (50,000 unit) capsule Take 50,000 Units by mouth every Monday.  fluticasone propionate (Flovent HFA) 44 mcg/actuation inhaler Take 2 Puffs by inhalation two (2) times a day.  insulin aspart protamine/insulin aspart (NovoLOG Mix 70-30 U-100 Insuln) 100 unit/mL (70-30) injection 75 Units by SubCUTAneous route two (2) times a day.  levothyroxine (SYNTHROID) 100 mcg tablet Take 100 mcg by mouth Daily (before breakfast).     simvastatin (ZOCOR) 40 mg tablet Take 40 mg by mouth nightly.  cloNIDine (CATAPRESS) 0.2 mg tablet Take 0.2 mg by mouth nightly.  albuterol (PROVENTIL HFA, VENTOLIN HFA) 90 mcg/actuation inhaler Take 2 Puffs by inhalation every four (4) hours as needed. No current facility-administered medications for this visit. SYSTEM REVIEW NOT RELATED TO LIVER DISEASE OR REVIEWED ABOVE:  Constitution systems: Negative for fever, chills, weight gain, weight loss. Eyes: Negative for visual changes. ENT: Negative for sore throat, painful swallowing. Respiratory: Negative for cough, hemoptysis, SOB. Cardiology: Negative for chest pain, palpitations. GI:  Negative for constipation or diarrhea. : Negative for urinary frequency, dysuria, hematuria, nocturia. Skin: Negative for rash. Hematology: Negative for easy bruising, blood clots. Musculo-skelatal: Negative for back pain, muscle pain, weakness. Neurologic: Negative for headaches, dizziness, vertigo, memory problems not related to HE. Psychology: Negative for anxiety, depression. FAMILY HISTORY:  The father  of COPD. The mother  of heart failure. The following family members have liver disease: father had fatty liver    SOCIAL HISTORY:  The patient is . The patient has 2 children, and 5 grandchildren. The patient stopped using tobacco products in . The patient has never consumed significant amounts of alcohol. The patient does not work. PHYSICAL EXAMINATION:  Visit Vitals  /61 (BP 1 Location: Left upper arm, BP Patient Position: Sitting, BP Cuff Size: Small adult)   Pulse 64   Temp 97 °F (36.1 °C)   Resp 15   Ht 5' 1.5\" (1.562 m)   Wt 162 lb (73.5 kg)   SpO2 96%   BMI 30.11 kg/m²     General: No acute distress. Eyes: Sclera anicteric. ENT: No oral lesions. Thyroid normal.  Nodes: No adenopathy. Skin: No spider angiomata. No jaundice. No palmar erythema.   Respiratory: Lungs clear to auscultation. Cardiovascular: Regular heart rate. No murmurs. No JVD. Abdomen: Soft non-tender. Liver size normal to percussion/palpation. Spleen not palpable. No obvious ascites. Extremities: No edema. No muscle wasting. No gross arthritic changes. Neurologic: Alert and oriented. Cranial nerves grossly intact. No asterixis. LABORATORY STUDIES:  Liver [place] of [address] Units 4/14/2021   WBC 3.4 - 10.8 x10E3/uL 5.9   ANC 1.4 - 7.0 x10E3/uL 2.7   HGB 11.1 - 15.9 g/dL 12.3    - 450 x10E3/uL 173   AST 0 - 40 IU/L 53 (H)   ALT 0 - 32 IU/L 42 (H)   Alk Phos 39 - 117 IU/L 65   Bili, Total 0.0 - 1.2 mg/dL <0.2   Bili, Direct 0.00 - 0.40 mg/dL 0.08   Albumin 3.8 - 4.8 g/dL 4.6   BUN 8 - 27 mg/dL 12   Creat 0.57 - 1.00 mg/dL 0.64   Na 134 - 144 mmol/L 139   K 3.5 - 5.2 mmol/L 4.4   Cl 96 - 106 mmol/L 101   CO2 20 - 29 mmol/L 20   Glucose 65 - 99 mg/dL 64 (L)       SEROLOGIES:  Serologies Latest Ref Rng & Units 4/14/2021   Hep A Ab, Total Negative Positive (A)   Hep B Surface Ag Negative Negative   Hep B Core Ab, Total Negative Negative   Hep B Surface AB QL  Non Reactive   Hep C Ab 0.0 - 0.9 s/co ratio <0.1   Ferritin 15 - 150 ng/mL 19   Iron % Saturation 15 - 55 % 8 (LL)       LIVER HISTOLOGY:  5/2021. FibroScan performed at [facility]. EkPa was 27.8. IQR/med 32%. . The results suggested a fibrosis level of F4. The CAP score suggests there is hepatic steatosis. ENDOSCOPIC PROCEDURES:  3/2021. EGD performed by Dr. Nile Alexander. No esophageal varices. No gastric varices. No portal gastropathy. Gastritis. RADIOLOGY:  2/2021. CT scan abdomen with IV contrast. Changes consistent with cirrhosis. No liver mass lesions. No dilated bile ducts. No ascites. OTHER TESTING:  Not available or performed    FOLLOW-UP:  All of the issues listed above in the Assessment and Plan were discussed with the patient. All questions were answered.   The patient expressed a clear understanding of the above. 1901 Merged with [facility] in 2 weeks after liver biopsy.         Frank Garza, AGPCNP-BC  [address] Liver [place] [address], [address]  [place], [address] [place] [address] - [address]  [phone]

## 2021-05-18 ENCOUNTER — HOSPITAL ENCOUNTER (OUTPATIENT)
Dept: ULTRASOUND IMAGING | Age: 61
Discharge: HOME | End: 2021-05-18
Attending: NURSE PRACTITIONER
Payer: MEDICAID

## 2021-05-18 ENCOUNTER — HOSPITAL ENCOUNTER (OUTPATIENT)
Dept: ULTRASOUND IMAGING | Age: 61
Discharge: HOME | End: 2021-05-18
Attending: NURSE PRACTITIONER | Admitting: RADIOLOGY
Payer: MEDICAID

## 2021-05-18 VITALS
HEIGHT: 62 IN | TEMPERATURE: 98.9 F | BODY MASS INDEX: 29.15 KG/M2 | OXYGEN SATURATION: 97 % | WEIGHT: 158.4 LBS | DIASTOLIC BLOOD PRESSURE: 57 MMHG | HEART RATE: 70 BPM | SYSTOLIC BLOOD PRESSURE: 126 MMHG | RESPIRATION RATE: 20 BRPM

## 2021-05-18 DIAGNOSIS — K74.60 CIRRHOSIS OF LIVER WITHOUT ASCITES, UNSPECIFIED HEPATIC CIRRHOSIS TYPE (HCC): ICD-10-CM

## 2021-05-18 LAB
ANION GAP SERPL CALC-SCNC: 7 MMOL/L (ref 3–18)
APTT PPP: 29.2 SEC (ref 23–36.4)
BUN SERPL-MCNC: 15 MG/DL (ref 7–18)
BUN/CREAT SERPL: 23 (ref 12–20)
CALCIUM SERPL-MCNC: 9.6 MG/DL (ref 8.5–10.1)
CHLORIDE SERPL-SCNC: 106 MMOL/L (ref 100–111)
CO2 SERPL-SCNC: 28 MMOL/L (ref 21–32)
CREAT SERPL-MCNC: 0.66 MG/DL (ref 0.6–1.3)
ERYTHROCYTE [DISTWIDTH] IN BLOOD BY AUTOMATED COUNT: 14.6 % (ref 11.6–14.5)
GLUCOSE SERPL-MCNC: 168 MG/DL (ref 74–99)
HCT VFR BLD AUTO: 37.2 % (ref 35–45)
HGB BLD-MCNC: 11.5 G/DL (ref 12–16)
INR PPP: 1.1 (ref 0.8–1.2)
MCH RBC QN AUTO: 27.1 PG (ref 24–34)
MCHC RBC AUTO-ENTMCNC: 30.9 G/DL (ref 31–37)
MCV RBC AUTO: 87.5 FL (ref 74–97)
PLATELET # BLD AUTO: 155 K/UL (ref 135–420)
PMV BLD AUTO: 12.5 FL (ref 9.2–11.8)
POTASSIUM SERPL-SCNC: 4 MMOL/L (ref 3.5–5.5)
PROTHROMBIN TIME: 13.9 SEC (ref 11.5–15.2)
RBC # BLD AUTO: 4.25 M/UL (ref 4.2–5.3)
SODIUM SERPL-SCNC: 141 MMOL/L (ref 136–145)
WBC # BLD AUTO: 5.1 K/UL (ref 4.6–13.2)

## 2021-05-18 PROCEDURE — 80048 BASIC METABOLIC PNL TOTAL CA: CPT

## 2021-05-18 PROCEDURE — 85610 PROTHROMBIN TIME: CPT

## 2021-05-18 PROCEDURE — 85730 THROMBOPLASTIN TIME PARTIAL: CPT

## 2021-05-18 PROCEDURE — 85027 COMPLETE CBC AUTOMATED: CPT

## 2021-05-18 PROCEDURE — 76705 ECHO EXAM OF ABDOMEN: CPT

## 2021-05-18 RX ORDER — SODIUM CHLORIDE 9 MG/ML
20 INJECTION, SOLUTION INTRAVENOUS CONTINUOUS
Status: DISCONTINUED | OUTPATIENT
Start: 2021-05-18 | End: 2021-05-18 | Stop reason: HOSPADM

## 2021-05-18 RX ORDER — ALBUMIN HUMAN 250 G/1000ML
25 SOLUTION INTRAVENOUS
Status: DISCONTINUED | OUTPATIENT
Start: 2021-05-18 | End: 2021-05-18 | Stop reason: HOSPADM

## 2021-05-18 RX ORDER — LIDOCAINE HYDROCHLORIDE 10 MG/ML
1-10 INJECTION, SOLUTION EPIDURAL; INFILTRATION; INTRACAUDAL; PERINEURAL
Status: DISCONTINUED | OUTPATIENT
Start: 2021-05-18 | End: 2021-05-18 | Stop reason: HOSPADM

## 2021-05-18 NOTE — DISCHARGE INSTRUCTIONS
[address] PARACENTESIS DISCHARGE INSTRUCTIONS    General Information:  During this procedure, the doctor will insert a needle into the abdomen to drain fluid. After the procedure, you will be able to take a deep breath much easier. The site of the puncture may ooze the first day. This will decrease and eventually stop. Paracentesis (draining fluid from the abdomen) sometimes makes patients hypotensive (low blood pressure). Your doctor may order for you to receive fluids or albumin (a volume booster) during the procedure through an IV site. Home Care Instructions:  Keep the puncture site clean and dry. No tub baths or swimming until puncture site heals. Showering is acceptable. Resume your normal diet, and resume your normal activity slowly and as you tolerate. If you are short of breath, rest. If shortness of breath does not ease, please call your ordering doctor. Fluid can re-accumulate in the chest and/or in the abdomen. If this should occur, your doctor needs to know as you may need to have the procedure done again. Call If:     You should call your Physician and/or the Radiology Nurse if you notice any signs of infection, like pus draining, or if it is swollen or reddened. Also call if you have a fever, or if you are bleeding from the puncture site more than a small amount on the dressing. Call if the puncture site keeps draining fluid. Some oozing is to be expected, but should slow and then stop. Call if you feel like you have pressure in your abdomen. SEEK IMMEDIATE CARE OR CALL 911 IF YOU SUDDENLY HAVE TROUBLE BREATHING, OR IF YOUR LIPS TURN BLUE, OR IF YOU NOTICE BLOOD IN YOUR SPUTUM. Follow-Up Instructions: Please see your ordering doctor as he/she has requested.      To Reach Us:        Date: 5/18/2021  Discharging Nurse: Diane Saavedra RN        DISCHARGE SUMMARY from Nurse    PATIENT INSTRUCTIONS:    After general anesthesia or intravenous sedation, for 24 hours or while taking prescription Narcotics:  · Limit your activities  · Do not drive and operate hazardous machinery  · Do not make important personal or business decisions  · Do  not drink alcoholic beverages  · If you have not urinated within 8 hours after discharge, please contact your surgeon on call. Report the following to your surgeon:  · Excessive pain, swelling, redness or odor of or around the surgical area  · Temperature over 100.5  · Nausea and vomiting lasting longer than 4 hours or if unable to take medications  · Any signs of decreased circulation or nerve impairment to extremity: change in color, persistent  numbness, tingling, coldness or increase pain  · Any questions    What to do at Home:  Recommended activity: Activity as tolerated,         *  Please give a list of your current medications to your Primary Care Provider. *  Please update this list whenever your medications are discontinued, doses are      changed, or new medications (including over-the-counter products) are added. *  Please carry medication information at all times in case of emergency situations. These are general instructions for a healthy lifestyle:    No smoking/ No tobacco products/ Avoid exposure to second hand smoke  Surgeon General's Warning:  Quitting smoking now greatly reduces serious risk to your health. Obesity, smoking, and sedentary lifestyle greatly increases your risk for illness    A healthy diet, regular physical exercise & weight monitoring are important for maintaining a healthy lifestyle    You may be retaining fluid if you have a history of heart failure or if you experience any of the following symptoms:  Weight gain of 3 pounds or more overnight or 5 pounds in a week, increased swelling in our hands or feet or shortness of breath while lying flat in bed. Please call your doctor as soon as you notice any of these symptoms; do not wait until your next office visit.         The discharge information has been reviewed with the patient and spouse. The patient and spouse verbalized understanding. Discharge medications reviewed with the patient and spouse and appropriate educational materials and side effects teaching were provided.       Patient armband removed and shredded    ___________________________________________________________________________________________________________________________________ r arm

## 2021-05-18 NOTE — PROGRESS NOTES
Pt is all prepped and ready for procedure. 1050 Pt back to care unit. Paracentesis not done, no fluid found from U/S    1100 Pt left in stable condition.

## 2021-05-26 ENCOUNTER — DOCUMENTATION ONLY (OUTPATIENT)
Dept: HEMATOLOGY | Age: 61
End: 2021-05-26

## 2021-05-26 DIAGNOSIS — Z01.812 PRE-PROCEDURE LAB EXAM: Primary | ICD-10-CM

## 2021-05-26 NOTE — PROGRESS NOTES
Spoke to patient reminding her to get COVID testing done this week also reminded of arrival time for procedure.

## 2021-05-27 ENCOUNTER — HOSPITAL ENCOUNTER (OUTPATIENT)
Dept: PREADMISSION TESTING | Age: 61
Discharge: HOME | End: 2021-05-27
Payer: MEDICAID

## 2021-05-27 PROCEDURE — U0003 INFECTIOUS AGENT DETECTION BY NUCLEIC ACID (DNA OR RNA); SEVERE ACUTE RESPIRATORY SYNDROME CORONAVIRUS 2 (SARS-COV-2) (CORONAVIRUS DISEASE [COVID-19]), AMPLIFIED PROBE TECHNIQUE, MAKING USE OF HIGH THROUGHPUT TECHNOLOGIES AS DESCRIBED BY CMS-2020-01-R: HCPCS

## 2021-05-28 LAB — SARS-COV-2, COV2NT: NOT DETECTED

## 2021-06-02 ENCOUNTER — OFFICE VISIT (OUTPATIENT)
Dept: HEMATOLOGY | Age: 61
End: 2021-06-02

## 2021-06-02 ENCOUNTER — HOSPITAL ENCOUNTER (OUTPATIENT)
Age: 61
Setting detail: OUTPATIENT SURGERY
Discharge: HOME | End: 2021-06-02
Attending: INTERNAL MEDICINE | Admitting: INTERNAL MEDICINE
Payer: MEDICAID

## 2021-06-02 ENCOUNTER — APPOINTMENT (OUTPATIENT)
Dept: ULTRASOUND IMAGING | Age: 61
End: 2021-06-02
Attending: INTERNAL MEDICINE
Payer: MEDICAID

## 2021-06-02 VITALS
HEIGHT: 62 IN | DIASTOLIC BLOOD PRESSURE: 69 MMHG | SYSTOLIC BLOOD PRESSURE: 149 MMHG | BODY MASS INDEX: 29.21 KG/M2 | WEIGHT: 158.7 LBS | HEART RATE: 79 BPM | RESPIRATION RATE: 18 BRPM | OXYGEN SATURATION: 94 % | TEMPERATURE: 97.4 F

## 2021-06-02 DIAGNOSIS — R74.8 ELEVATED LIVER ENZYMES: ICD-10-CM

## 2021-06-02 DIAGNOSIS — K76.0 NAFLD (NONALCOHOLIC FATTY LIVER DISEASE): ICD-10-CM

## 2021-06-02 DIAGNOSIS — R79.89 ELEVATED LFTS: ICD-10-CM

## 2021-06-02 LAB — GLUCOSE BLD STRIP.AUTO-MCNC: 141 MG/DL (ref 70–110)

## 2021-06-02 PROCEDURE — 88313 SPECIAL STAINS GROUP 2: CPT

## 2021-06-02 PROCEDURE — 77030013826 HC NDL BIOP MAXCOR BARD -B: Performed by: INTERNAL MEDICINE

## 2021-06-02 PROCEDURE — 88307 TISSUE EXAM BY PATHOLOGIST: CPT

## 2021-06-02 PROCEDURE — 47000 NEEDLE BIOPSY OF LIVER PERQ: CPT | Performed by: INTERNAL MEDICINE

## 2021-06-02 PROCEDURE — 82962 GLUCOSE BLOOD TEST: CPT

## 2021-06-02 PROCEDURE — 76942 ECHO GUIDE FOR BIOPSY: CPT | Performed by: INTERNAL MEDICINE

## 2021-06-02 PROCEDURE — 74011000250 HC RX REV CODE- 250: Performed by: INTERNAL MEDICINE

## 2021-06-02 PROCEDURE — 76705 ECHO EXAM OF ABDOMEN: CPT

## 2021-06-02 PROCEDURE — 2709999900 HC NON-CHARGEABLE SUPPLY: Performed by: INTERNAL MEDICINE

## 2021-06-02 PROCEDURE — 76040000019: Performed by: INTERNAL MEDICINE

## 2021-06-02 RX ORDER — SODIUM CHLORIDE 0.9 % (FLUSH) 0.9 %
5-40 SYRINGE (ML) INJECTION AS NEEDED
Status: CANCELLED | OUTPATIENT
Start: 2021-06-02

## 2021-06-02 RX ORDER — ONDANSETRON 2 MG/ML
4 INJECTION INTRAMUSCULAR; INTRAVENOUS
Status: DISCONTINUED | OUTPATIENT
Start: 2021-06-02 | End: 2021-06-02 | Stop reason: HOSPADM

## 2021-06-02 RX ORDER — LIDOCAINE HYDROCHLORIDE 10 MG/ML
10 INJECTION INFILTRATION; PERINEURAL ONCE
Status: COMPLETED | OUTPATIENT
Start: 2021-06-02 | End: 2021-06-02

## 2021-06-02 RX ORDER — ACETAMINOPHEN 500 MG
1000 TABLET ORAL
COMMUNITY

## 2021-06-02 RX ORDER — HYDROMORPHONE HYDROCHLORIDE 2 MG/ML
1 INJECTION, SOLUTION INTRAMUSCULAR; INTRAVENOUS; SUBCUTANEOUS
Status: DISCONTINUED | OUTPATIENT
Start: 2021-06-02 | End: 2021-06-02 | Stop reason: HOSPADM

## 2021-06-02 RX ORDER — SODIUM CHLORIDE 0.9 % (FLUSH) 0.9 %
5-40 SYRINGE (ML) INJECTION EVERY 8 HOURS
Status: DISCONTINUED | OUTPATIENT
Start: 2021-06-02 | End: 2021-06-02 | Stop reason: HOSPADM

## 2021-06-02 NOTE — PROCEDURES
[address] [facility], MD, [address], [place] Chaka Gotti, Camilo Sandoval MD, MPH      Bryce Phelps, CAITLIN Jones, [facility]     Kelly Meek, [facility]   KWAKU Olivia, [facility]       [facility]    at [address]    [address] [place] [address], [facility] Malachi Abernathy  22.    [phone]    FAX: [address] [place] [address], [address]    [phone]    FAX: [phone]         LIVER BIOPSY PROCEDURE NOTE    Brenita Factor  1960    INDICATIONS/PRE-OPERATIVE  DIAGNOSIS:  NAFLD    : Jose Mark MD    SURGICAL ASSISTANT:  None    PROSTHETIC DEVICES, TISSUE GRAFTS, TRANSPLANTED ORGANS:  Not applicable    SEDATION: 1% Lidocaine injection 10 ml    PROCEDURE:  Informed consent to perform the procedure was obtained from the patient. The patient was positioned on the edge of the stretcher lying flat in the supine position. Ultrasound was utilized to image the liver. The diaphragm and any major mass lesion or vascular structures within the liver were identified. An appropriate site for liver biopsy was identified. The distance from the surface of the skin to the liver capsule was 3 cm. This area was prepped with betadine and draped in sterile fashion. The skin was infiltrated with 1% lidocaine. The deeper subcutanous tissues and liver capsule overlying the biopsy site were then infiltrated with 1% lidocaine until appropriate anesthesia was obtained. A small incision was made in the skin so the biopsy devise could be easily inserted. A total of 2 passes with the 16 gauge Bard biopsy devise was then made into the liver. Core(s) of liver tissue totaling 4 cm in length were obtained and placed into tissue fixative.   A band aid was placed over the biopsy site. The patient was then repositioned on the right side and transported to the recovery area on the stretcher for routine monitoring until discharge. The specimen was sent to pathology for processing via the normal transport mechanism. SPECIMEN COLLECTED: Liver    INTERVENTIONS:  None    ESTIMATED BLOOD LOSS: Negligible.      POST-OPERATIVE DIAGNOSIS: Same as Pre-operative Diagnosis      Rodolfo Elliott [place]  [address] [address], [address] [facility] [address]  [address] [place] [address]

## 2021-06-02 NOTE — H&P
Carlos Alberto Lerner MD, Kaylee Lemon MD, MPH      CAITLIN Cheung, ACNP-BC     April S Gaviota, AGPCNP-BC   KWAKU Ramirez, [facility]       Colby Deputado [facility] 136    at [address], [address] Malachi Abernathy  22.    [phone]    FAX: [address] [place] [address], [address]    [phone]    FAX: [phone]         PRE-PROCEDURE NOTE - LIVER BIOPSY    H and P from last office visit reviewed. Allergies reviewed. Out-patient medication list reviewed. Patient Active Problem List   Diagnosis Code    Nephrolithiasis N20.0    Lumbar radiculopathy M54.16    Gastroparesis K31.84    Controlled type 2 diabetes mellitus, with long-term current use of insulin (HCC) E11.9, Z79.4    Essential hypertension I10    Asthma J45.909    Acquired hypothyroidism E03.9    Cirrhosis of liver without ascites (HCC) K74.60    Cutaneous lupus erythematosus L93.2    Pancreatic insufficiency K86.89       Allergies   Allergen Reactions    Lisinopril Angioedema    Adhesive Tape-Silicones Other (comments)     welps    Niacin Other (comments)     Flushing      Vicodin [Hydrocodone-Acetaminophen] Itching       No current facility-administered medications on file prior to encounter. Current Outpatient Medications on File Prior to Encounter   Medication Sig Dispense Refill    acetaminophen (Tylenol Extra Strength) 500 mg tablet Take 1,000 mg by mouth every six (6) hours as needed for Pain.  venlafaxine-SR (Effexor XR) 150 mg capsule Take 150 mg by mouth daily.  gabapentin (NEURONTIN) 300 mg capsule Take 300 mg by mouth two (2) times a day.       metFORMIN (GLUCOPHAGE) 1,000 mg tablet Take 1,000 mg by mouth two (2) times daily (with meals).  amLODIPine (NORVASC) 5 mg tablet Take 5 mg by mouth daily.  glipiZIDE SR (GLUCOTROL XL) 5 mg CR tablet Take 5 mg by mouth two (2) times a day.  fenofibrate nanocrystallized (TRICOR) 145 mg tablet Take 145 mg by mouth daily.  hydrOXYzine HCL (ATARAX) 25 mg tablet Take 25 mg by mouth nightly.  zolpidem (Ambien) 5 mg tablet Take 5 mg by mouth nightly.  ARIPiprazole (ABILIFY) 2 mg tablet Take 2 mg by mouth nightly.  fluticasone propionate (Flovent HFA) 44 mcg/actuation inhaler Take 2 Puffs by inhalation two (2) times a day.  insulin aspart protamine/insulin aspart (NovoLOG Mix 70-30 U-100 Insuln) 100 unit/mL (70-30) injection 75 Units by SubCUTAneous route two (2) times a day.  levothyroxine (SYNTHROID) 100 mcg tablet Take 100 mcg by mouth Daily (before breakfast).  simvastatin (ZOCOR) 40 mg tablet Take 40 mg by mouth nightly.  cloNIDine (CATAPRESS) 0.2 mg tablet Take 0.2 mg by mouth nightly.  albuterol (PROVENTIL HFA, VENTOLIN HFA) 90 mcg/actuation inhaler Take 2 Puffs by inhalation every four (4) hours as needed.  ondansetron (ZOFRAN ODT) 8 mg disintegrating tablet Take 0.5 Tabs by mouth every eight (8) hours as needed for Nausea. 30 Tab 0    ergocalciferol (Vitamin D2) 1,250 mcg (50,000 unit) capsule Take 50,000 Units by mouth every Monday. (Patient not taking: Reported on 6/2/2021)         For liver biopsy to assess elevated liver enzymes. The risks of the procedure were discussed with the patient. This included bleeding, pain, and puncture of other organs. All questions were answered. The patient wishes to proceed with the procedure. The patient was counseled at length about the risks of arnaldo Covid-19 in the jose r-operative and post-operative states including the recovery window of their procedure.   The patient was made aware that arnaldo Covid-19 after a surgical procedure may worsen their prognosis for recovering from the virus and lend to a higher morbidity and or mortality risk. The patient was given the options of postponing their procedure. All of the risks, benefits, and alternatives were discussed. The patient does  wish to proceed with the procedure. PHYSICAL EXAMINATION:  Visit Vitals  BP (!) 136/56   Pulse 67   Temp 96.9 °F (36.1 °C)   Resp 16   Ht 5' 1.5\" (1.562 m)   Wt 158 lb 11.2 oz (72 kg)   SpO2 97%   Breastfeeding No   BMI 29.50 kg/m²       General: No acute distress. Eyes: Sclera anicteric. ENT: No oral lesions. Thyroid normal.  Nodes: No adenopathy. Skin: No spider angiomata. No jaundice. No palmar erythema. Respiratory: Lungs clear to auscultation. Cardiovascular: Regular heart rate. No murmurs. No JVD. Abdomen: Soft non-tender, liver size normal to percussion/palpation. Spleen not palpable. No obvious ascites. Extremities: No edema. No muscle wasting. No gross arthritic changes. Neurologic: Alert and oriented. Cranial nerves grossly intact. No asterixis. LABS:  Lab Results   Component Value Date/Time    WBC 5.1 05/18/2021 09:43 AM    HGB 11.5 (L) 05/18/2021 09:43 AM    HCT 37.2 05/18/2021 09:43 AM    PLATELET 857 13/76/3315 09:43 AM    MCV 87.5 05/18/2021 09:43 AM     Lab Results   Component Value Date/Time    INR 1.1 05/18/2021 09:43 AM    Prothrombin time 13.9 05/18/2021 09:43 AM       ASSESSMENT AND PLAN:  Liver biopsy under ultrasound guidance.     Isaias Alvarado MD  [address]  4 [facility], [address] [place] [address]  [employer], [address]  12 [facility]

## 2021-06-02 NOTE — DISCHARGE INSTRUCTIONS
[address] [facility] MD Carlos, Jamie Gilliland MD, MPH      CAITLIN Aceves ACNP-BC     Kelly Meek PCNP-BC   ALFRED Arredondo-STEVEN [facility]       Colby Deputado [facility] 136    at [address], [address] Malachi Abernathy  22.    [phone]    FAX: [address] [place] [address], [address]    [phone]    FAX: [address] [place] [address]  1960  Date: 6/2/2021    DIET:    Kj Rashid may resume your previous diet. ACTIVITIES:  Rest quietly the rest of today. You should not lift any objects more than 20 pounds for the next 2 days. If you work sitting down without strenuous activity you may return to work tomorrow. If you exert yourself or do heavy lifting at work you should take tomorrow off. Do not drive or operate hazardous machinery for 12 hours after you are discharged from this procedure. SPECIAL INSTRUCTIONS:  Do not use any aspirin or non-steroidal (Motin, Advil, Naproxen, etc) pain medications for the next 2 days. You may use extra-strength Tylenol (acetaminophen) if you experience pain or discomfort later today. Restarting blood thinners: If you were taking blood thinners prior to the procedure you can restart these in 2 days. Call the [address] office if you experience any of the following:  Persistent or severe abdominal pain. Persistent or severe abdominal distention. Fever and chills   Nausea and vomiting. New or unusual symptoms. Follow-up care: You should have a follow up appointment with Dr. Iona Barnes to review the results of the liver biopsy results in 2 weeks.   If you do not have an appointment please call the office at the number listed above to schedule this. Other instructions: If you have any problems or questions call the [address] [place] [address] office at the phone number listed above. DISCHARGE SUMMARY from Nurse: The following personal items collected during your admission are returned to you:   Dental Appliance: Dental Appliances: At home  Vision: Visual Aid: Glasses, At bedside  Hearing Aid:    Jewelry:    Clothing:    Other Valuables:    Valuables sent to safe:            Learning About Coronavirus (COVID-19)  Coronavirus (COVID-19): Overview  What is coronavirus (OPUYA-15)? The coronavirus disease (COVID-19) is caused by a virus. It is an illness that was first found in [place], [place], in December 2019. It has since spread worldwide. The virus can cause fever, cough, and trouble breathing. In severe cases, it can cause pneumonia and make it hard to breathe without help. It can cause death. Coronaviruses are a large group of viruses. They cause the common cold. They also cause more serious illnesses like Middle East respiratory syndrome (MERS) and severe acute respiratory syndrome (SARS). COVID-19 is caused by a novel coronavirus. That means it's a new type that has not been seen in people before. This virus spreads person-to-person through droplets from coughing and sneezing. It can also spread when you are close to someone who is infected. And it can spread when you touch something that has the virus on it, such as a doorknob or a tabletop. What can you do to protect yourself from coronavirus (COVID-19)? The best way to protect yourself from getting sick is to:  · Avoid areas where there is an outbreak. · Avoid contact with people who may be infected. · Wash your hands often with soap or alcohol-based hand sanitizers. · Avoid crowds and try to stay at least 6 feet away from other people. · Wash your hands often, especially after you cough or sneeze.  Use soap and water, and scrub for at least 20 seconds. If soap and water aren't available, use an alcohol-based hand . · Avoid touching your mouth, nose, and eyes. What can you do to avoid spreading the virus to others? To help avoid spreading the virus to others:  · Cover your mouth with a tissue when you cough or sneeze. Then throw the tissue in the trash. · Use a disinfectant to clean things that you touch often. · Stay home if you are sick or have been exposed to the virus. Don't go to school, work, or public areas. And don't use public transportation. · If you are sick:  ? Leave your home only if you need to get medical care. But call the doctor's office first so they know you're coming. And wear a face mask, if you have one.  ? If you have a face mask, wear it whenever you're around other people. It can help stop the spread of the virus when you cough or sneeze. ? Clean and disinfect your home every day. Use household  and disinfectant wipes or sprays. Take special care to clean things that you grab with your hands. These include doorknobs, remote controls, phones, and handles on your refrigerator and microwave. And don't forget countertops, tabletops, bathrooms, and computer keyboards. When to call for help  Call 911 anytime you think you may need emergency care. For example, call if:  · You have severe trouble breathing. (You can't talk at all.)  · You have constant chest pain or pressure. · You are severely dizzy or lightheaded. · You are confused or can't think clearly. · Your face and lips have a blue color. · You pass out (lose consciousness) or are very hard to wake up. Call your doctor now if you develop symptoms such as:  · Shortness of breath. · Fever. · Cough. If you need to get care, call ahead to the doctor's office for instructions before you go. Make sure you wear a face mask, if you have one, to prevent exposing other people to the virus.   Where can you get the latest information? The following health organizations are tracking and studying this virus. Their websites contain the most up-to-date information. Hitesh Kim also learn what to do if you think you may have been exposed to the virus. · U.S. Centers for Disease Control and Prevention (CDC): The CDC provides updated news about the disease and travel advice. The website also tells you how to prevent the spread of infection. www.cdc.gov  · World Health Organization [facility]): WHO offers information about the virus outbreaks. WHO also has travel advice. www.who.int  Current as of: April 1, 2020               Content Version: 12.4  © 2006-2020 [employer]. Care instructions adapted under license by your healthcare professional. If you have questions about a medical condition or this instruction, always ask your healthcare professional. [address] any warranty or liability for your use of this information. DISCHARGE SUMMARY from Nurse    PATIENT INSTRUCTIONS:    After general anesthesia or intravenous sedation, for 24 hours or while taking prescription Narcotics:  · Limit your activities  · Do not drive and operate hazardous machinery  · Do not make important personal or business decisions  · Do  not drink alcoholic beverages  · If you have not urinated within 8 hours after discharge, please contact your surgeon on call. Report the following to your surgeon:  · Excessive pain, swelling, redness or odor of or around the surgical area  · Temperature over 100.5  · Nausea and vomiting lasting longer than 4 hours or if unable to take medications  · Any signs of decreased circulation or nerve impairment to extremity: change in color, persistent  numbness, tingling, coldness or increase pain  · Any questions    What to do at Home:  Recommended activity: AS ABOVE,         *  Please give a list of your current medications to your Primary Care Provider.     *  Please update this list whenever your medications are discontinued, doses are      changed, or new medications (including over-the-counter products) are added. *  Please carry medication information at all times in case of emergency situations. These are general instructions for a healthy lifestyle:    No smoking/ No tobacco products/ Avoid exposure to second hand smoke  Surgeon General's Warning:  Quitting smoking now greatly reduces serious risk to your health. Obesity, smoking, and sedentary lifestyle greatly increases your risk for illness    A healthy diet, regular physical exercise & weight monitoring are important for maintaining a healthy lifestyle    You may be retaining fluid if you have a history of heart failure or if you experience any of the following symptoms:  Weight gain of 3 pounds or more overnight or 5 pounds in a week, increased swelling in our hands or feet or shortness of breath while lying flat in bed. Please call your doctor as soon as you notice any of these symptoms; do not wait until your next office visit. The discharge information has been reviewed with the patient and spouse. The patient and spouse verbalized understanding. Discharge medications reviewed with the patient and spouse and appropriate educational materials and side effects teaching were provided.   ___________________________________________________________________________________________________________________________________    Patient armband removed and shredded

## 2021-06-16 ENCOUNTER — HOSPITAL ENCOUNTER (OUTPATIENT)
Dept: LAB | Age: 61
Discharge: HOME | End: 2021-06-16

## 2021-06-16 ENCOUNTER — OFFICE VISIT (OUTPATIENT)
Dept: HEMATOLOGY | Age: 61
End: 2021-06-16
Payer: MEDICAID

## 2021-06-16 VITALS
SYSTOLIC BLOOD PRESSURE: 153 MMHG | TEMPERATURE: 98.1 F | WEIGHT: 158 LBS | DIASTOLIC BLOOD PRESSURE: 70 MMHG | BODY MASS INDEX: 29.37 KG/M2 | OXYGEN SATURATION: 98 %

## 2021-06-16 DIAGNOSIS — K74.60 CIRRHOSIS OF LIVER WITHOUT ASCITES, UNSPECIFIED HEPATIC CIRRHOSIS TYPE (HCC): Primary | ICD-10-CM

## 2021-06-16 LAB — XX-LABCORP SPECIMEN COL,LCBCF: NORMAL

## 2021-06-16 PROCEDURE — 99001 SPECIMEN HANDLING PT-LAB: CPT

## 2021-06-16 PROCEDURE — 99214 OFFICE O/P EST MOD 30 MIN: CPT | Performed by: INTERNAL MEDICINE

## 2021-06-16 NOTE — Clinical Note
7/4/2021    Patient: Nora Monk   YOB: 1960   Date of Visit: 6/16/2021     Jonel Suárez MD  [address] [phone]  Via Fax: [phone]    Dear Jonel Suárez MD,      Thank you for referring Ms. Nora Monk to [address] [place] [address],[address] for evaluation. My notes for this consultation are attached. If you have questions, please do not hesitate to call me. I look forward to following your patient along with you.       Sincerely,    Olinda Marquis MD

## 2021-06-16 NOTE — PROGRESS NOTES
[address] [facility]      Lyric Mendenhall MD, [facility], Dottie Ackerman MD, MPH      Alina Sung, PA-C    Juana Calixto, [facility]     Kelly Meek, [facility]   Bobby Northern, [facility]    Viviane Brmaggy, [facility]       Colby [facility] [address]    at [address], [facility] Malachi Abernathy  22.    [phone]    FAX: [address] [place] [address] Drive, [address], [address]    [phone]    FAX: [phone]       Patient Care Team:  Debi Pleitez MD as PCP - General (Family Medicine)  Albertina Leal MD (Gastroenterology)  Elizabeth Palomares NP (Nurse Practitioner)      Problem List  Date Reviewed: 7/4/2021        Codes Class Noted    GUZMAN (nonalcoholic steatohepatitis) ICD-10-CM: K75.81  ICD-9-CM: 571.8  7/4/2021        Pancreatic insufficiency ICD-10-CM: K86.89  ICD-9-CM: 577.8  5/17/2021        Gastroparesis ICD-10-CM: K31.84  ICD-9-CM: 536.3  4/14/2021        Controlled type 2 diabetes mellitus, with long-term current use of insulin ([facility] ICD-10-CM: E11.9, Z79.4  ICD-9-CM: 250.00, V58.67  4/14/2021        Hypertension ICD-10-CM: I10  ICD-9-CM: 401.9  4/14/2021        Asthma ICD-10-CM: J45.909  ICD-9-CM: 493.90  4/14/2021        Hypothyroidism ICD-10-CM: E03.9  ICD-9-CM: 244.9  4/14/2021        Cirrhosis ([facility] ICD-10-CM: K74.60  ICD-9-CM: 571.5  4/14/2021        Cutaneous lupus erythematosus ICD-10-CM: L93.2  ICD-9-CM: 695.4  4/14/2021        Lumbar radiculopathy ICD-10-CM: M54.16  ICD-9-CM: 724.4  9/14/2020        Nephrolithiasis ICD-10-CM: N20.0  ICD-9-CM: 592.0  8/14/2012              Alyse Mg is being seen at [address] for management of cirrhosis secondary to non-alcoholic steatohepatitis (GUZMAN).   The active problem list, all pertinent past medical history, medications, liver histology, radiologic findings and laboratory findings related to the liver disorder were reviewed and discussed with the patient. The patient is a 57 y.o.  female who was found to have chronic liver disease and cirrhosis in 2/2021 when she had a CT scan. The patient underwent a liver biopsy in 6/2021. The procedure was well tolerated. I have personally reviewed and interpreted the liver biopsy slides. This demonstrates GUZMAN with cirrhosis. The patient has not developed any of the major complications of cirrhosis to date. The patient denies having any symptoms which could be attributed to the liver disorder. The patient is not experiencing the following symptoms which are commonly seen in this liver disorder:   fatigue, pain in the right side over the liver, problems concentrating. The patient completes all daily activities without any functional       ASSESSMENT AND PLAN:  Cirrhosis  The diagnosis of cirrhosis is based upon liver biopsy, imaging, Fibroscan  Cirrhosis is secondary to GUZMAN. The patient has normal liver function. The patient has never developed any complications of cirrhosis to date. The CTP is 5. Child class A. The MELD score is 6. GUZMAN  The diagnosis is based upon liver biopsy, imaging, Fiboscan CAP score, features of metabolic syndrome, serologic studies that are negative for other causes of chronic liver disease. A liver biopsy in 6/2021 demonstrated GUZMAN with moderate steatosis, mild inflammation, mild ballooning, MICHELLE (211) and cirrhosis. Fibroscan performed in 5/2021 was 27.8 with CAP score 376 suggesting fatty liver with cirrhosis. If the patient loses 20% of current body weight, which is 32 pounds, down to a weight of 130 pounds, all steatosis will have resolved. Once all steatosis has resolved all inflammation will resolve.   Then all fibrosis will gradually resolve and the liver could eventually be normal.    There is currently no FDA approved medical treatment for fatty liver, NALFD or GUZMAN. The only medical treatments for GUZMAN are though clinical trials. The patient is not currently eligible to enter a clinical trial for treatment of GUZMAN because of cirrhosis. Counseling for diet and weight loss in patients with confirmed or suspected NAFLD  The patient was counseled regarding diet and exercise to achieve weight loss. The best diet for patients with fatty liver is one very low in carbohydrates and enriched with protein such as an Park's program.      The patient was told not to consume any food products and drinks containing fructose as this enhances hepatic fat synthesis. There is no medication or vitamin supplements that we advocate for GUZMAN. Using glitazones in patients without diabetes mellitus has been shown to reduce fat content in the liver but has no effect on fibrosis and is associated with weight gain. Vitamin E has also been used but the data is not very good and most experts no longer advocate this. Screening for Esophageal varices   The patient does not have esophageal or gastric varices. The last EGD to assess for varices was performed in 3/2021. Hepatic encephalopathy   Overt HE has not developed to date. There is no need for treatment with lactulose and/or Xifaxan at this time. There is no need to restrict dietary protein at this time. Screening for Hepatocellular Carcinoma  HCC screening has recently been performed and does not suggest [facility] The next liver imaging study will be performed in 8/2021. AFP was ordered today. Treatment of other medical problems in patients with chronic liver disease  The patient has cirrhrosis and should avoid taking NSAIDs which are associated with a higher rate of developing SUDEEP.         The patient can take any medications utilized for treatment of DM, statins to treat hypercholesterolemia    Normal doses of acetaminophen, as recommended on the label of the bottle, are not hepatotoxic except in the setting of daily alcohol use, even in patients with cirrhosis and can be utilized for pain. Counseling for alcohol in patients with chronic liver disease  The patient was counseled regarding alcohol consumption and the effect of alcohol on chronic liver disease. The patient has cirrhosis and was advised to be abstinent from all alcohol including non-alcoholic beer which does contain some alcohol. The patient does not consume any significant amount of alcohol. Osteoporosis  The risk of osteoporosis is increased in patients with cirrhosis. DEXA bone density to assess for osteoporosis has not been performed. This should be ordered by the patients primary care physician. Vaccinations   Vaccination for viral hepatitis B is recommended since the patient has no serologic evidence of previous exposure or vaccination with immunity. Vaccination for viral hepatitis A is not needed. The patient has serologic evidence of prior exposure or vaccination with immunity. Routine vaccinations against other bacterial and viral agents can be performed as indicated. Annual flu vaccination should be administered if indicated. ALLERGIES  Allergies   Allergen Reactions    Lisinopril Angioedema    Adhesive Tape-Silicones Other (comments)     welps    Niacin Other (comments)     Flushing      Vicodin [Hydrocodone-Acetaminophen] Itching       MEDICATIONS  Current Outpatient Medications   Medication Sig    acetaminophen (Tylenol Extra Strength) 500 mg tablet Take 1,000 mg by mouth every six (6) hours as needed for Pain.  venlafaxine-SR (Effexor XR) 150 mg capsule Take 150 mg by mouth daily.  ondansetron (ZOFRAN ODT) 8 mg disintegrating tablet Take 0.5 Tabs by mouth every eight (8) hours as needed for Nausea.  gabapentin (NEURONTIN) 300 mg capsule Take 300 mg by mouth two (2) times a day.     metFORMIN (GLUCOPHAGE) 1,000 mg tablet Take 1,000 mg by mouth two (2) times daily (with meals).  amLODIPine (NORVASC) 5 mg tablet Take 5 mg by mouth daily.  glipiZIDE SR (GLUCOTROL XL) 5 mg CR tablet Take 5 mg by mouth two (2) times a day.  fenofibrate nanocrystallized (TRICOR) 145 mg tablet Take 145 mg by mouth daily.  hydrOXYzine HCL (ATARAX) 25 mg tablet Take 25 mg by mouth nightly.  zolpidem (Ambien) 5 mg tablet Take 5 mg by mouth nightly.  ARIPiprazole (ABILIFY) 2 mg tablet Take 2 mg by mouth nightly.  ergocalciferol (Vitamin D2) 1,250 mcg (50,000 unit) capsule Take 50,000 Units by mouth every Monday.  fluticasone propionate (Flovent HFA) 44 mcg/actuation inhaler Take 2 Puffs by inhalation two (2) times a day.  insulin aspart protamine/insulin aspart (NovoLOG Mix 70-30 U-100 Insuln) 100 unit/mL (70-30) injection 75 Units by SubCUTAneous route two (2) times a day.  levothyroxine (SYNTHROID) 100 mcg tablet Take 100 mcg by mouth Daily (before breakfast).  simvastatin (ZOCOR) 40 mg tablet Take 40 mg by mouth nightly.  cloNIDine (CATAPRESS) 0.2 mg tablet Take 0.2 mg by mouth nightly.  albuterol (PROVENTIL HFA, VENTOLIN HFA) 90 mcg/actuation inhaler Take 2 Puffs by inhalation every four (4) hours as needed. No current facility-administered medications for this visit. SYSTEM REVIEW NOT RELATED TO LIVER DISEASE OR REVIEWED ABOVE:  Constitution systems: Negative for fever, chills, weight gain, weight loss. Eyes: Negative for visual changes. ENT: Negative for sore throat, painful swallowing. Respiratory: Negative for cough, hemoptysis, SOB. Cardiology: Negative for chest pain, palpitations. GI:  Negative for constipation or diarrhea. : Negative for urinary frequency, dysuria, hematuria, nocturia. Skin: Negative for rash. Hematology: Negative for easy bruising, blood clots. Musculo-skelatal: Negative for back pain, muscle pain, weakness.   Neurologic: Negative for headaches, dizziness, vertigo, memory problems not related to HE. Psychology: Negative for anxiety, depression. FAMILY HISTORY:  The father  of COPD. The mother  of heart failure. The following family members have liver disease: father had fatty liver    SOCIAL HISTORY:  The patient is . The patient has 2 children, and 5 grandchildren. The patient stopped using tobacco products in . The patient has never consumed significant amounts of alcohol. The patient does not work. PHYSICAL EXAMINATION:  Visit Vitals  BP (!) 153/70   Temp 98.1 °F (36.7 °C) (Tympanic)   Wt 158 lb (71.7 kg)   SpO2 98%   BMI 29.37 kg/m²     General: No acute distress. Eyes: Sclera anicteric. ENT: No oral lesions. Thyroid normal.  Nodes: No adenopathy. Skin: No spider angiomata. No jaundice. No palmar erythema. Respiratory: Lungs clear to auscultation. Cardiovascular: Regular heart rate. No murmurs. No JVD. Abdomen: Soft non-tender. Liver size normal to percussion/palpation. Spleen not palpable. No obvious ascites. Extremities: No edema. No muscle wasting. No gross arthritic changes. Neurologic: Alert and oriented. Cranial nerves grossly intact. No asterixis.     LABORATORY STUDIES:  Liver [place] of [address] [place] [address] Units 2021   WBC 3.4 - 10.8 x10E3/uL 5.9   ANC 1.4 - 7.0 x10E3/uL 2.7   HGB 11.1 - 15.9 g/dL 12.3    - 450 x10E3/uL 173   AST 0 - 40 IU/L 53 (H)   ALT 0 - 32 IU/L 42 (H)   Alk Phos 39 - 117 IU/L 65   Bili, Total 0.0 - 1.2 mg/dL <0.2   Bili, Direct 0.00 - 0.40 mg/dL 0.08   Albumin 3.8 - 4.8 g/dL 4.6   BUN 8 - 27 mg/dL 12   Creat 0.57 - 1.00 mg/dL 0.64   Na 134 - 144 mmol/L 139   K 3.5 - 5.2 mmol/L 4.4   Cl 96 - 106 mmol/L 101   CO2 20 - 29 mmol/L 20   Glucose 65 - 99 mg/dL 64 (L)     Cancer Screening Latest Ref Rng & Units 2021   AFP, Serum 0.0 - 8.0 ng/mL 5.7   AFP-L3% 0.0 - 9.9 % 11.5 (H)     SEROLOGIES:  Serologies Latest Ref Rng & Units 2021 Hep A Ab, Total Negative Positive (A)   Hep B Surface Ag Negative Negative   Hep B Core Ab, Total Negative Negative   Hep B Surface AB QL  Non Reactive   Hep C Ab 0.0 - 0.9 s/co ratio <0.1   Ferritin 15 - 150 ng/mL 19   Iron % Saturation 15 - 55 % 8 (LL)       LIVER HISTOLOGY:  5/2021. FibroScan performed at [address]. EkPa was 27.8. IQR/med 32%. . The results suggested a fibrosis level of F4. The CAP score suggests there is hepatic steatosis. 6/2021. Slides reviewed by MLS. GUZMAN. 33-50% macrovesicualr and micovesicular steatosis, mild inflammation, mild ballooning, Stage 4 fibrosis. MICHELLE (211). ENDOSCOPIC PROCEDURES:  3/2021. EGD performed by Dr. Guevara Guadarrama. No esophageal varices. No gastric varices. No portal gastropathy. Gastritis. RADIOLOGY:  2/2021. CT scan abdomen with IV contrast. Changes consistent with cirrhosis. No liver mass lesions. No dilated bile ducts. No ascites. OTHER TESTING:  Not available or performed    FOLLOW-UP:  All of the issues listed above in the Assessment and Plan were discussed with the patient. All questions were answered. The patient expressed a clear understanding of the above. [facility] 87 in 3 months for monitoring.       Alyssa Stone MD  [address]  4 [facility], [address] [place] [address] Ericka Roth, [address]  12 [facility]

## 2021-06-18 LAB
A1AT SERPL-MCNC: 162 MG/DL (ref 101–187)
ACTIN IGG SERPL-ACNC: 7 UNITS (ref 0–19)
ALBUMIN SERPL-MCNC: 4.8 G/DL (ref 3.8–4.8)
ALBUMIN/GLOB SERPL: 1.5 {RATIO} (ref 1.2–2.2)
ALP SERPL-CCNC: 62 IU/L (ref 48–121)
ALT SERPL-CCNC: 32 IU/L (ref 0–32)
ANA TITR SER IF: NEGATIVE {TITER}
AST SERPL-CCNC: 33 IU/L (ref 0–40)
BASOPHILS # BLD AUTO: 0 X10E3/UL (ref 0–0.2)
BASOPHILS NFR BLD AUTO: 1 %
BILIRUB SERPL-MCNC: 0.3 MG/DL (ref 0–1.2)
BUN SERPL-MCNC: 12 MG/DL (ref 8–27)
BUN/CREAT SERPL: 19 (ref 12–28)
CALCIUM SERPL-MCNC: 11.3 MG/DL (ref 8.7–10.3)
CERULOPLASMIN SERPL-MCNC: 34.2 MG/DL (ref 19–39)
CHLORIDE SERPL-SCNC: 98 MMOL/L (ref 96–106)
CO2 SERPL-SCNC: 23 MMOL/L (ref 20–29)
CREAT SERPL-MCNC: 0.63 MG/DL (ref 0.57–1)
EOSINOPHIL # BLD AUTO: 0.1 X10E3/UL (ref 0–0.4)
EOSINOPHIL NFR BLD AUTO: 1 %
ERYTHROCYTE [DISTWIDTH] IN BLOOD BY AUTOMATED COUNT: 14.9 % (ref 11.7–15.4)
GLOBULIN SER CALC-MCNC: 3.1 G/DL (ref 1.5–4.5)
GLUCOSE SERPL-MCNC: 248 MG/DL (ref 65–99)
HCT VFR BLD AUTO: 40.3 % (ref 34–46.6)
HGB BLD-MCNC: 12.9 G/DL (ref 11.1–15.9)
IMM GRANULOCYTES # BLD AUTO: 0 X10E3/UL (ref 0–0.1)
IMM GRANULOCYTES NFR BLD AUTO: 0 %
LYMPHOCYTES # BLD AUTO: 1.6 X10E3/UL (ref 0.7–3.1)
LYMPHOCYTES NFR BLD AUTO: 38 %
MCH RBC QN AUTO: 27.7 PG (ref 26.6–33)
MCHC RBC AUTO-ENTMCNC: 32 G/DL (ref 31.5–35.7)
MCV RBC AUTO: 87 FL (ref 79–97)
MONOCYTES # BLD AUTO: 0.4 X10E3/UL (ref 0.1–0.9)
MONOCYTES NFR BLD AUTO: 10 %
NEUTROPHILS # BLD AUTO: 2.1 X10E3/UL (ref 1.4–7)
NEUTROPHILS NFR BLD AUTO: 50 %
PLATELET # BLD AUTO: 163 X10E3/UL (ref 150–450)
POTASSIUM SERPL-SCNC: 4.8 MMOL/L (ref 3.5–5.2)
PROT SERPL-MCNC: 7.9 G/DL (ref 6–8.5)
RBC # BLD AUTO: 4.66 X10E6/UL (ref 3.77–5.28)
SODIUM SERPL-SCNC: 139 MMOL/L (ref 134–144)
WBC # BLD AUTO: 4.3 X10E3/UL (ref 3.4–10.8)

## 2021-07-04 PROBLEM — K75.81 NASH (NONALCOHOLIC STEATOHEPATITIS): Status: ACTIVE | Noted: 2021-07-04

## 2021-11-09 ENCOUNTER — HOSPITAL ENCOUNTER (OUTPATIENT)
Dept: LAB | Age: 61
Discharge: HOME | End: 2021-11-09

## 2021-11-09 ENCOUNTER — OFFICE VISIT (OUTPATIENT)
Dept: HEMATOLOGY | Age: 61
End: 2021-11-09
Payer: MEDICAID

## 2021-11-09 VITALS
BODY MASS INDEX: 28.71 KG/M2 | TEMPERATURE: 97.6 F | RESPIRATION RATE: 17 BRPM | HEIGHT: 62 IN | OXYGEN SATURATION: 97 % | DIASTOLIC BLOOD PRESSURE: 78 MMHG | HEART RATE: 69 BPM | WEIGHT: 156 LBS | SYSTOLIC BLOOD PRESSURE: 166 MMHG

## 2021-11-09 DIAGNOSIS — K74.60 CIRRHOSIS OF LIVER WITHOUT ASCITES, UNSPECIFIED HEPATIC CIRRHOSIS TYPE (HCC): Primary | ICD-10-CM

## 2021-11-09 LAB — XX-LABCORP SPECIMEN COL,LCBCF: NORMAL

## 2021-11-09 PROCEDURE — 99214 OFFICE O/P EST MOD 30 MIN: CPT | Performed by: NURSE PRACTITIONER

## 2021-11-09 PROCEDURE — 99001 SPECIMEN HANDLING PT-LAB: CPT

## 2021-11-09 NOTE — PROGRESS NOTES
[facility]      Fei Edouard MD, Fede Kirk, Raul Christopher MD, MPH      Wandy Mcarthur, PA-C    Andrea Chandler, [facility]     Kelly Meek, [facility]   Jacky Blum P-STEVEN Campos, [facility]       Colby Fallon Philip De Alfaro [address]    at [address], [facility] Malachi Hickey  22.    [phone]    FAX: [address] [place] [address] [address], [address]    [phone]    FAX: [phone]       Patient Care Team:  Jazmine Key MD as PCP - General (Family Medicine)  Elda Edouard MD (Gastroenterology)  Andreas Geronimo NP (Nurse Practitioner)      Problem List  Date Reviewed: 11/9/2021          Codes Class Noted    GUZMAN (nonalcoholic steatohepatitis) ICD-10-CM: K75.81  ICD-9-CM: 571.8  7/4/2021        Pancreatic insufficiency ICD-10-CM: K86.89  ICD-9-CM: 577.8  5/17/2021        Gastroparesis ICD-10-CM: K31.84  ICD-9-CM: 536.3  4/14/2021        Controlled type 2 diabetes mellitus, with long-term current use of insulin ([facility] ICD-10-CM: E11.9, Z79.4  ICD-9-CM: 250.00, V58.67  4/14/2021        Hypertension ICD-10-CM: I10  ICD-9-CM: 401.9  4/14/2021        Asthma ICD-10-CM: J45.909  ICD-9-CM: 493.90  4/14/2021        Hypothyroidism ICD-10-CM: E03.9  ICD-9-CM: 244.9  4/14/2021        Cirrhosis ([facility] ICD-10-CM: K74.60  ICD-9-CM: 571.5  4/14/2021        Cutaneous lupus erythematosus ICD-10-CM: L93.2  ICD-9-CM: 695.4  4/14/2021        Lumbar radiculopathy ICD-10-CM: M54.16  ICD-9-CM: 724.4  9/14/2020        Nephrolithiasis ICD-10-CM: N20.0  ICD-9-CM: 592.0  8/14/2012              Nemesio Gary is being seen at [address] for management of cirrhosis secondary to non-alcoholic steatohepatitis (GUZMAN).   The active problem list, all pertinent past medical history, medications, liver histology, radiologic findings and laboratory findings related to the liver disorder were reviewed and discussed with the patient. The patient is a 57 y.o.  female who was found to have chronic liver disease and cirrhosis in 2/2021 when she had a CT scan. The patient underwent a liver biopsy in 6/2021. This demonstrates GUMZAN with cirrhosis. The patient has not developed any of the major complications of cirrhosis to date. The patient denies having any symptoms which could be attributed to the liver disorder. The patient is not experiencing the following symptoms which are commonly seen in this liver disorder:   fatigue, pain in the right side over the liver, problems concentrating. The patient completes all daily activities without any functional       ASSESSMENT AND PLAN:  Cirrhosis  The diagnosis of cirrhosis is based upon liver biopsy, imaging, Fibroscan  Cirrhosis is secondary to GUZMAN. The patient has normal liver function. The patient has never developed any complications of cirrhosis to date. The CTP is 5. Child class A. The MELD score is 6. GUZMAN  The diagnosis is based upon liver biopsy, imaging, Fiboscan CAP score, features of metabolic syndrome, serologic studies that are negative for other causes of chronic liver disease. A liver biopsy in 6/2021 demonstrated GUZMAN with moderate steatosis, mild inflammation, mild ballooning, MICHELLE (211) and cirrhosis. Fibroscan performed in 5/2021 was 27.8 with CAP score 376 suggesting fatty liver with cirrhosis. If the patient loses 20% of current body weight, which is 32 pounds, down to a weight of 130 pounds, all steatosis will have resolved. Once all steatosis has resolved all inflammation will resolve. Then all fibrosis will gradually resolve and the liver could eventually be normal.    There is currently no FDA approved medical treatment for fatty liver, NALFD or GUZMAN.   The only medical treatments for GUZMAN are through clinical trials. The patient is not currently eligible to enter a clinical trial for treatment of GUZMAN because of cirrhosis. Counseling for diet and weight loss in patients with confirmed or suspected NAFLD  The patient was counseled regarding diet and exercise to achieve weight loss. The best diet for patients with fatty liver is one very low in carbohydrates and enriched with protein such as an Park[employer] program.      The patient was told not to consume any food products and drinks containing fructose as this enhances hepatic fat synthesis. There is no medication or vitamin supplements that we advocate for GUZMAN. Using glitazones in patients without diabetes mellitus has been shown to reduce fat content in the liver but has no effect on fibrosis and is associated with weight gain. Vitamin E has also been used but the data is not very good and most experts no longer advocate this. Screening for Esophageal varices   The patient does not have esophageal or gastric varices. The last EGD to assess for varices was performed in 3/2021. Next EGD to assess for varices is due in 3/2024. Hepatic encephalopathy   Overt HE has not developed to date. There is no need for treatment with lactulose and/or Xifaxan at this time. There is no need to restrict dietary protein at this time. Screening for Hepatocellular Carcinoma  HCC screening has recently been performed and does not suggest [facility] [address] The next liver imaging study will be performed in 11/2021. AFP was ordered today. Treatment of other medical problems in patients with chronic liver disease  The patient has cirrhrosis and should avoid taking NSAIDs which are associated with a higher rate of developing SUDEEP.         The patient can take any medications utilized for treatment of DM, statins to treat hypercholesterolemia    Normal doses of acetaminophen, as recommended on the label of the bottle, are not hepatotoxic except in the setting of daily alcohol use, even in patients with cirrhosis and can be utilized for pain. Counseling for alcohol in patients with chronic liver disease  The patient was counseled regarding alcohol consumption and the effect of alcohol on chronic liver disease. The patient has cirrhosis and was advised to be abstinent from all alcohol including non-alcoholic beer which does contain some alcohol. The patient does not consume any significant amount of alcohol. Osteoporosis  The risk of osteoporosis is increased in patients with cirrhosis. DEXA bone density to assess for osteoporosis has not been performed. This should be ordered by the patients primary care physician. Vaccinations   Vaccination for viral hepatitis B is recommended since the patient has no serologic evidence of previous exposure or vaccination with immunity. Vaccination for viral hepatitis A is not needed. The patient has serologic evidence of prior exposure or vaccination with immunity. Routine vaccinations against other bacterial and viral agents can be performed as indicated. Annual flu vaccination should be administered if indicated. ALLERGIES  Allergies   Allergen Reactions    Lisinopril Angioedema    Adhesive Tape-Silicones Other (comments)     welps    Niacin Other (comments)     Flushing      Vicodin [Hydrocodone-Acetaminophen] Itching       MEDICATIONS  Current Outpatient Medications   Medication Sig    acetaminophen (Tylenol Extra Strength) 500 mg tablet Take 1,000 mg by mouth every six (6) hours as needed for Pain.  venlafaxine-SR (Effexor XR) 150 mg capsule Take 150 mg by mouth daily.  ondansetron (ZOFRAN ODT) 8 mg disintegrating tablet Take 0.5 Tabs by mouth every eight (8) hours as needed for Nausea.  gabapentin (NEURONTIN) 300 mg capsule Take 300 mg by mouth two (2) times a day.     metFORMIN (GLUCOPHAGE) 1,000 mg tablet Take 1,000 mg by mouth two (2) times daily (with meals).  amLODIPine (NORVASC) 5 mg tablet Take 5 mg by mouth daily.  glipiZIDE SR (GLUCOTROL XL) 5 mg CR tablet Take 5 mg by mouth two (2) times a day.  fenofibrate nanocrystallized (TRICOR) 145 mg tablet Take 145 mg by mouth daily.  hydrOXYzine HCL (ATARAX) 25 mg tablet Take 25 mg by mouth nightly.  zolpidem (Ambien) 5 mg tablet Take 5 mg by mouth nightly.  ARIPiprazole (ABILIFY) 2 mg tablet Take 2 mg by mouth nightly.  ergocalciferol (Vitamin D2) 1,250 mcg (50,000 unit) capsule Take 50,000 Units by mouth every Monday.  fluticasone propionate (Flovent HFA) 44 mcg/actuation inhaler Take 2 Puffs by inhalation two (2) times a day.  insulin aspart protamine/insulin aspart (NovoLOG Mix 70-30 U-100 Insuln) 100 unit/mL (70-30) injection 75 Units by SubCUTAneous route two (2) times a day.  levothyroxine (SYNTHROID) 100 mcg tablet Take 100 mcg by mouth Daily (before breakfast).  simvastatin (ZOCOR) 40 mg tablet Take 40 mg by mouth nightly.  cloNIDine (CATAPRESS) 0.2 mg tablet Take 0.2 mg by mouth nightly.  albuterol (PROVENTIL HFA, VENTOLIN HFA) 90 mcg/actuation inhaler Take 2 Puffs by inhalation every four (4) hours as needed. No current facility-administered medications for this visit. SYSTEM REVIEW NOT RELATED TO LIVER DISEASE OR REVIEWED ABOVE:  Constitution systems: Negative for fever, chills, weight gain, weight loss. Eyes: Negative for visual changes. ENT: Negative for sore throat, painful swallowing. Respiratory: Negative for cough, hemoptysis, SOB. Cardiology: Negative for chest pain, palpitations. GI:  Negative for constipation or diarrhea. : Negative for urinary frequency, dysuria, hematuria, nocturia. Skin: Negative for rash. Hematology: Negative for easy bruising, blood clots. Musculo-skelatal: Negative for back pain, muscle pain, weakness.   Neurologic: Negative for headaches, dizziness, vertigo, memory problems not related to HE.  Psychology: Negative for anxiety, depression. FAMILY HISTORY:  The father  of COPD. The mother  of heart failure. The following family members have liver disease: father had fatty liver    SOCIAL HISTORY:  The patient is . The patient has 2 children, and 17 grandchildren, 1 great grandchild. The patient stopped using tobacco products in . The patient has never consumed significant amounts of alcohol. The patient does not work. PHYSICAL EXAMINATION:  Visit Vitals  BP (!) 166/78   Pulse 69   Temp 97.6 °F (36.4 °C)   Resp 17   Ht 5' 1.5\" (1.562 m)   Wt 156 lb (70.8 kg)   SpO2 97%   BMI 29.00 kg/m²     General: No acute distress. Eyes: Sclera anicteric. ENT: No oral lesions. Thyroid normal.  Nodes: No adenopathy. Skin: No spider angiomata. No jaundice. No palmar erythema. Respiratory: Lungs clear to auscultation. Cardiovascular: Regular heart rate. No murmurs. No JVD. Abdomen: Soft non-tender. Liver size normal to percussion/palpation. Spleen not palpable. No obvious ascites. Extremities: No edema. No muscle wasting. No gross arthritic changes. Neurologic: Alert and oriented. Cranial nerves grossly intact. No asterixis.     LABORATORY STUDIES:  [facility] of [address] [place] [address] 2021   WBC 3.4 - 10.8 x10E3/uL 4.3 4.3   ANC 1.4 - 7.0 x10E3/uL 2.3 2.1   HGB 11.1 - 15.9 g/dL 13.9 12.9    - 450 x10E3/uL 153 163   AST 0 - 40 IU/L 34 33   ALT 0 - 32 IU/L 35 (H) 32   Alk Phos 44 - 121 IU/L 61 62   Bili, Total 0.0 - 1.2 mg/dL 0.4 0.3   Bili, Direct 0.00 - 0.40 mg/dL 0.15    Albumin 3.8 - 4.8 g/dL 5.0 (H) 4.8   BUN 8 - 27 mg/dL 18 12   Creat 0.57 - 1.00 mg/dL 0.68 0.63   Na 134 - 144 mmol/L 141 139   K 3.5 - 5.2 mmol/L 4.6 4.8   Cl 96 - 106 mmol/L 100 98   CO2 20 - 29 mmol/L 23 23   Glucose 65 - 99 mg/dL 208 (H) 248 (H)     Cancer Screening Latest Ref Rng & Units 2021   AFP, Serum 0.0 - 8.0 ng/mL 5.7   AFP-L3% 0.0 - 9.9 % 11.5 (H)     SEROLOGIES:  Serologies Latest Ref Rng & Units 4/14/2021   Hep A Ab, Total Negative Positive (A)   Hep B Surface Ag Negative Negative   Hep B Core Ab, Total Negative Negative   Hep B Surface AB QL  Non Reactive   Hep C Ab 0.0 - 0.9 s/co ratio <0.1   Ferritin 15 - 150 ng/mL 19   Iron % Saturation 15 - 55 % 8 (LL)       LIVER HISTOLOGY:  5/2021. [facility]. EkPa was 27.8. IQR/med 32%. . The results suggested a fibrosis level of F4. The CAP score suggests there is hepatic steatosis. 6/2021. Slides reviewed by MLS. GUZMAN. 33-50% macrovesicualr and micovesicular steatosis, mild inflammation, mild ballooning, Stage 4 fibrosis. MICHELLE (211). ENDOSCOPIC PROCEDURES:  3/2021. EGD performed by Dr. Renu Purcell. No esophageal varices. No gastric varices. No portal gastropathy. Gastritis. RADIOLOGY:  2/2021. CT scan abdomen with IV contrast. Changes consistent with cirrhosis. No liver mass lesions. No dilated bile ducts. No ascites. 5/2021. Ultrasound of liver. Echogenic consistent with cirrhosis. No liver mass lesions. No dilated bile ducts. No ascites. OTHER TESTING:  Not available or performed    FOLLOW-UP:  All of the issues listed above in the Assessment and Plan were discussed with the patient. All questions were answered. The patient expressed a clear understanding of the above. [facility] in 6 months for monitoring.       Drew Ramírez, JASON-BC  . [address] [place] [address], [facility] [address]  [facility], [address] [place] [address] Street - [address]  [phone]

## 2021-11-10 LAB
AFP L3 MFR SERPL: 11.4 % (ref 0–9.9)
AFP SERPL-MCNC: 5.6 NG/ML (ref 0–8)
ALBUMIN SERPL-MCNC: 5 G/DL (ref 3.8–4.8)
ALP SERPL-CCNC: 61 IU/L (ref 44–121)
ALT SERPL-CCNC: 35 IU/L (ref 0–32)
AST SERPL-CCNC: 34 IU/L (ref 0–40)
BASOPHILS # BLD AUTO: 0 X10E3/UL (ref 0–0.2)
BASOPHILS NFR BLD AUTO: 1 %
BILIRUB DIRECT SERPL-MCNC: 0.15 MG/DL (ref 0–0.4)
BILIRUB SERPL-MCNC: 0.4 MG/DL (ref 0–1.2)
BUN SERPL-MCNC: 18 MG/DL (ref 8–27)
BUN/CREAT SERPL: 26 (ref 12–28)
CALCIUM SERPL-MCNC: 11 MG/DL (ref 8.7–10.3)
CHLORIDE SERPL-SCNC: 100 MMOL/L (ref 96–106)
CO2 SERPL-SCNC: 23 MMOL/L (ref 20–29)
CREAT SERPL-MCNC: 0.68 MG/DL (ref 0.57–1)
EOSINOPHIL # BLD AUTO: 0 X10E3/UL (ref 0–0.4)
EOSINOPHIL NFR BLD AUTO: 1 %
ERYTHROCYTE [DISTWIDTH] IN BLOOD BY AUTOMATED COUNT: 13 % (ref 11.7–15.4)
GLUCOSE SERPL-MCNC: 208 MG/DL (ref 65–99)
HCT VFR BLD AUTO: 41.6 % (ref 34–46.6)
HGB BLD-MCNC: 13.9 G/DL (ref 11.1–15.9)
IMM GRANULOCYTES # BLD AUTO: 0 X10E3/UL (ref 0–0.1)
IMM GRANULOCYTES NFR BLD AUTO: 0 %
LYMPHOCYTES # BLD AUTO: 1.5 X10E3/UL (ref 0.7–3.1)
LYMPHOCYTES NFR BLD AUTO: 35 %
MCH RBC QN AUTO: 29.5 PG (ref 26.6–33)
MCHC RBC AUTO-ENTMCNC: 33.4 G/DL (ref 31.5–35.7)
MCV RBC AUTO: 88 FL (ref 79–97)
MONOCYTES # BLD AUTO: 0.4 X10E3/UL (ref 0.1–0.9)
MONOCYTES NFR BLD AUTO: 10 %
NEUTROPHILS # BLD AUTO: 2.3 X10E3/UL (ref 1.4–7)
NEUTROPHILS NFR BLD AUTO: 53 %
PLATELET # BLD AUTO: 153 X10E3/UL (ref 150–450)
POTASSIUM SERPL-SCNC: 4.6 MMOL/L (ref 3.5–5.2)
PROT SERPL-MCNC: 8.1 G/DL (ref 6–8.5)
RBC # BLD AUTO: 4.71 X10E6/UL (ref 3.77–5.28)
SODIUM SERPL-SCNC: 141 MMOL/L (ref 134–144)
WBC # BLD AUTO: 4.3 X10E3/UL (ref 3.4–10.8)

## 2021-12-08 ENCOUNTER — HOSPITAL ENCOUNTER (OUTPATIENT)
Dept: ULTRASOUND IMAGING | Age: 61
Discharge: HOME | End: 2021-12-08
Attending: NURSE PRACTITIONER
Payer: MEDICAID

## 2021-12-08 DIAGNOSIS — K74.60 CIRRHOSIS OF LIVER WITHOUT ASCITES, UNSPECIFIED HEPATIC CIRRHOSIS TYPE (HCC): ICD-10-CM

## 2021-12-08 PROCEDURE — 76705 ECHO EXAM OF ABDOMEN: CPT

## 2021-12-13 DIAGNOSIS — K76.9 LIVER LESION: Primary | ICD-10-CM

## 2022-03-18 PROBLEM — L93.2 CUTANEOUS LUPUS ERYTHEMATOSUS: Status: ACTIVE | Noted: 2021-04-14

## 2022-03-19 PROBLEM — K75.81 NASH (NONALCOHOLIC STEATOHEPATITIS): Status: ACTIVE | Noted: 2021-07-04

## 2022-03-19 PROBLEM — I10 HYPERTENSION: Status: ACTIVE | Noted: 2021-04-14

## 2022-03-19 PROBLEM — M54.16 LUMBAR RADICULOPATHY: Status: ACTIVE | Noted: 2020-09-14

## 2022-03-19 PROBLEM — J45.909 ASTHMA: Status: ACTIVE | Noted: 2021-04-14

## 2022-03-19 PROBLEM — E03.9 HYPOTHYROIDISM: Status: ACTIVE | Noted: 2021-04-14

## 2022-03-19 PROBLEM — K74.60 CIRRHOSIS (HCC): Status: ACTIVE | Noted: 2021-04-14

## 2022-03-19 PROBLEM — K31.84 GASTROPARESIS: Status: ACTIVE | Noted: 2021-04-14

## 2022-03-19 PROBLEM — K86.89 PANCREATIC INSUFFICIENCY: Status: ACTIVE | Noted: 2021-05-17

## 2022-03-19 PROBLEM — E11.9 CONTROLLED TYPE 2 DIABETES MELLITUS, WITH LONG-TERM CURRENT USE OF INSULIN (HCC): Status: ACTIVE | Noted: 2021-04-14

## 2022-03-19 PROBLEM — Z79.4 CONTROLLED TYPE 2 DIABETES MELLITUS, WITH LONG-TERM CURRENT USE OF INSULIN (HCC): Status: ACTIVE | Noted: 2021-04-14

## 2022-05-10 ENCOUNTER — HOSPITAL ENCOUNTER (OUTPATIENT)
Dept: LAB | Age: 62
Discharge: HOME | End: 2022-05-10

## 2022-05-10 ENCOUNTER — OFFICE VISIT (OUTPATIENT)
Dept: HEMATOLOGY | Age: 62
End: 2022-05-10
Payer: MEDICAID

## 2022-05-10 VITALS
RESPIRATION RATE: 16 BRPM | DIASTOLIC BLOOD PRESSURE: 60 MMHG | BODY MASS INDEX: 28.71 KG/M2 | SYSTOLIC BLOOD PRESSURE: 112 MMHG | WEIGHT: 156 LBS | OXYGEN SATURATION: 96 % | TEMPERATURE: 97 F | HEART RATE: 67 BPM | HEIGHT: 62 IN

## 2022-05-10 DIAGNOSIS — K74.60 CIRRHOSIS OF LIVER WITHOUT ASCITES, UNSPECIFIED HEPATIC CIRRHOSIS TYPE (HCC): ICD-10-CM

## 2022-05-10 DIAGNOSIS — F41.9 ANXIETY: ICD-10-CM

## 2022-05-10 DIAGNOSIS — K76.9 LIVER LESION: Primary | ICD-10-CM

## 2022-05-10 LAB — XX-LABCORP SPECIMEN COL,LCBCF: NORMAL

## 2022-05-10 PROCEDURE — 99214 OFFICE O/P EST MOD 30 MIN: CPT | Performed by: NURSE PRACTITIONER

## 2022-05-10 PROCEDURE — 99001 SPECIMEN HANDLING PT-LAB: CPT

## 2022-05-10 RX ORDER — METOPROLOL SUCCINATE 25 MG/1
TABLET, EXTENDED RELEASE ORAL DAILY
COMMUNITY

## 2022-05-10 NOTE — PROGRESS NOTES
[address] [facility], [place], [address], [place], [place]      Nathalie Goodwin, PA-STEVEN Liz, ACNP-BC     Kelly Meek, [facility]   Phil Long, LANIEP-STEVEN Gill, [facility]       Colby Fallon Philip [address]    at [address], [address] [place] [address], [place]  22.    [phone]    FAX: [address] [place] [address], [address]    [phone]    FAX: [phone]     Patient Care Team:  Marcus Nice MD as PCP - General (Family Medicine)  Becca Vargas MD (Gastroenterology)  Taina Leon NP (Nurse Practitioner)      Problem List  Date Reviewed: 5/10/2022          Codes Class Noted    GUZMAN (nonalcoholic steatohepatitis) ICD-10-CM: K75.81  ICD-9-CM: 571.8  7/4/2021        Pancreatic insufficiency ICD-10-CM: K86.89  ICD-9-CM: 577.8  5/17/2021        Gastroparesis ICD-10-CM: K31.84  ICD-9-CM: 536.3  4/14/2021        Controlled type 2 diabetes mellitus, with long-term current use of insulin ([facility] ICD-10-CM: E11.9, Z79.4  ICD-9-CM: 250.00, V58.67  4/14/2021        Hypertension ICD-10-CM: I10  ICD-9-CM: 401.9  4/14/2021        Asthma ICD-10-CM: J45.909  ICD-9-CM: 493.90  4/14/2021        Hypothyroidism ICD-10-CM: E03.9  ICD-9-CM: 244.9  4/14/2021        Cirrhosis ([facility] ICD-10-CM: K74.60  ICD-9-CM: 571.5  4/14/2021        Cutaneous lupus erythematosus ICD-10-CM: L93.2  ICD-9-CM: 695.4  4/14/2021        Lumbar radiculopathy ICD-10-CM: M54.16  ICD-9-CM: 724.4  9/14/2020        Nephrolithiasis ICD-10-CM: N20.0  ICD-9-CM: 592.0  8/14/2012              Carlita Arcos is being seen at [address] for management of cirrhosis secondary to non-alcoholic steatohepatitis (GUZMAN).   The active problem list, all pertinent past medical history, medications, liver histology, radiologic findings and laboratory findings related to the liver disorder were reviewed and discussed with the patient. The patient is a 60 y.o.  female who was found to have chronic liver disease and cirrhosis in 2/2021 when she had a CT scan. The patient underwent a liver biopsy in 6/2021. This demonstrates GUZMAN with cirrhosis. The most recent imaging of the liver was Ultrasound performed in 12/2021. Results suggest cirrhosis. 1.1 cm hypoechoic lesion in the right lobe    The patient has not developed any of the major complications of cirrhosis to date. The patient denies having any symptoms which could be attributed to the liver disorder. The patient is not experiencing the following symptoms which are commonly seen in this liver disorder:   fatigue, pain in the right side over the liver, problems concentrating. The patient completes all daily activities without any functional       ASSESSMENT AND PLAN:  Cirrhosis  The diagnosis of cirrhosis is based upon liver biopsy, imaging, Fibroscan  Cirrhosis is secondary to GUZMAN. The patient has normal liver function. The patient has never developed any complications of cirrhosis to date. The CTP is 5. Child class A. The MELD score is 6. GUZMAN  The diagnosis is based upon liver biopsy, imaging, Fiboscan CAP score, features of metabolic syndrome, serologic studies that are negative for other causes of chronic liver disease. A liver biopsy in 6/2021 demonstrated GUZMAN with moderate steatosis, mild inflammation, mild ballooning, MICHELLE (211) and cirrhosis. Fibroscan performed in 5/2021 was 27.8 with CAP score 376 suggesting fatty liver with cirrhosis. If the patient loses 20% of current body weight, which is 32 pounds, down to a weight of 130 pounds, all steatosis will have resolved. Once all steatosis has resolved all inflammation will resolve.   Then all fibrosis will gradually resolve and the liver could eventually be normal.    There is currently no FDA approved medical treatment for fatty liver, NALFD or GUZMAN. The only medical treatments for GUZMAN are through clinical trials. The patient is not currently eligible to enter a clinical trial for treatment of GUZMAN because of cirrhosis. Counseling for diet and weight loss in patients with confirmed or suspected NAFLD  The patient was counseled regarding diet and exercise to achieve weight loss. The best diet for patients with fatty liver is one very low in carbohydrates and enriched with protein such as an Park's program.      The patient was told not to consume any food products and drinks containing fructose as this enhances hepatic fat synthesis. There is no medication or vitamin supplements that we advocate for GUZMAN. Using glitazones in patients without diabetes mellitus has been shown to reduce fat content in the liver but has no effect on fibrosis and is associated with weight gain. Vitamin E has also been used but the data is not very good and most experts no longer advocate this. Screening for Esophageal varices   The patient does not have esophageal or gastric varices. The last EGD to assess for varices was performed in 3/2021. Next EGD to assess for varices is due in 3/2024. Hepatic encephalopathy   Overt HE has not developed to date. There is no need for treatment with lactulose and/or Xifaxan at this time. There is no need to restrict dietary protein at this time. Screening for Hepatocellular Carcinoma  HCC screening was performed in 12/2021 and demonstrates a lesion on ultrasound. Will perform dynamic MRI to further characterize the lesion and help determine if this is HCC. Treatment of other medical problems in patients with chronic liver disease  The patient has cirrhosis and should avoid taking NSAIDs which are associated with a higher rate of developing SUDEEP.         The patient can take any medications utilized for treatment of DM, statins to treat hypercholesterolemia    Normal doses of acetaminophen, as recommended on the label of the bottle, are not hepatotoxic except in the setting of daily alcohol use, even in patients with cirrhosis and can be utilized for pain. Counseling for alcohol in patients with chronic liver disease  The patient was counseled regarding alcohol consumption and the effect of alcohol on chronic liver disease. The patient has cirrhosis and was advised to be abstinent from all alcohol including non-alcoholic beer which does contain some alcohol. The patient does not consume any significant amount of alcohol. Osteoporosis  The risk of osteoporosis is increased in patients with cirrhosis. DEXA bone density to assess for osteoporosis has not been performed. This should be ordered by the patients primary care physician. Vaccinations   Vaccination for viral hepatitis B is recommended since the patient has no serologic evidence of previous exposure or vaccination with immunity. Vaccination for viral hepatitis A is not needed. The patient has serologic evidence of prior exposure or vaccination with immunity. Routine vaccinations against other bacterial and viral agents can be performed as indicated. Annual flu vaccination should be administered if indicated. ALLERGIES  Allergies   Allergen Reactions    Lisinopril Angioedema    Adhesive Tape-Silicones Other (comments)     welps    Niacin Other (comments)     Flushing      Vicodin [Hydrocodone-Acetaminophen] Itching       MEDICATIONS  Current Outpatient Medications   Medication Sig    diazePAM (VALIUM) 5 mg tablet Take one tablet at least one hour prior to your MRI. May take other tablet if needed before the procedure. Indications: anxious    metoprolol succinate (TOPROL-XL) 25 mg XL tablet Take  by mouth daily.     acetaminophen (Tylenol Extra Strength) 500 mg tablet Take 1,000 mg by mouth every six (6) hours as needed for Pain.  venlafaxine-SR (Effexor XR) 150 mg capsule Take 150 mg by mouth daily.  gabapentin (NEURONTIN) 300 mg capsule Take 300 mg by mouth two (2) times a day.  metFORMIN (GLUCOPHAGE) 1,000 mg tablet Take 1,000 mg by mouth two (2) times daily (with meals).  amLODIPine (NORVASC) 5 mg tablet Take 5 mg by mouth daily.  glipiZIDE SR (GLUCOTROL XL) 5 mg CR tablet Take 5 mg by mouth two (2) times a day.  fenofibrate nanocrystallized (TRICOR) 145 mg tablet Take 145 mg by mouth daily.  hydrOXYzine HCL (ATARAX) 25 mg tablet Take 25 mg by mouth nightly.  zolpidem (Ambien) 5 mg tablet Take 5 mg by mouth nightly.  ARIPiprazole (ABILIFY) 2 mg tablet Take 2 mg by mouth nightly.  fluticasone propionate (Flovent HFA) 44 mcg/actuation inhaler Take 2 Puffs by inhalation two (2) times a day.  insulin aspart protamine/insulin aspart (NovoLOG Mix 70-30 U-100 Insuln) 100 unit/mL (70-30) injection 75 Units by SubCUTAneous route two (2) times a day.  levothyroxine (SYNTHROID) 100 mcg tablet Take 100 mcg by mouth Daily (before breakfast).  simvastatin (ZOCOR) 40 mg tablet Take 40 mg by mouth nightly.  cloNIDine (CATAPRESS) 0.2 mg tablet Take 0.2 mg by mouth nightly.  albuterol (PROVENTIL HFA, VENTOLIN HFA) 90 mcg/actuation inhaler Take 2 Puffs by inhalation every four (4) hours as needed. No current facility-administered medications for this visit. SYSTEM REVIEW NOT RELATED TO LIVER DISEASE OR REVIEWED ABOVE:  Constitution systems: Negative for fever, chills, weight gain, weight loss. Eyes: Negative for visual changes. ENT: Negative for sore throat, painful swallowing. Respiratory: Negative for cough, hemoptysis, SOB. Cardiology: Negative for chest pain, palpitations. GI:  Negative for constipation or diarrhea. : Negative for urinary frequency, dysuria, hematuria, nocturia. Skin: Negative for rash. Hematology: Negative for easy bruising, blood clots. Musculo-skelatal: Negative for back pain, muscle pain, weakness. Neurologic: Negative for headaches, dizziness, vertigo, memory problems not related to HE. Psychology: Negative for anxiety, depression. FAMILY HISTORY:  The father  of COPD. The mother  of heart failure. The following family members have liver disease: father had fatty liver    SOCIAL HISTORY:  The patient is . The patient has 2 children, and 17 grandchildren, 1 great grandchild. The patient stopped using tobacco products in . The patient has never consumed significant amounts of alcohol. The patient does not work. PHYSICAL EXAMINATION:  Visit Vitals  /60   Pulse 67   Temp 97 °F (36.1 °C)   Resp 16   Ht 5' 1.5\" (1.562 m)   Wt 156 lb (70.8 kg)   SpO2 96%   BMI 29.00 kg/m²     General: No acute distress. Eyes: Sclera anicteric. ENT: No oral lesions. Thyroid normal.  Nodes: No adenopathy. Skin: No spider angiomata. No jaundice. No palmar erythema. Respiratory: Lungs clear to auscultation. Cardiovascular: Regular heart rate. No murmurs. No JVD. Abdomen: Soft non-tender. Liver size normal to percussion/palpation. Spleen not palpable. No obvious ascites. Extremities: No edema. No muscle wasting. No gross arthritic changes. Neurologic: Alert and oriented. Cranial nerves grossly intact. No asterixis.     LABORATORY STUDIES:  Liver [place] of [address] Units 5/10/2022 2021   WBC 3.4 - 10.8 x10E3/uL 4.8 4.3   ANC 1.4 - 7.0 x10E3/uL 2.2 2.3   HGB 11.1 - 15.9 g/dL 13.6 13.9    - 450 x10E3/uL 200 153   INR 0.9 - 1.2 1.0    AST 0 - 40 IU/L 30 34   ALT 0 - 32 IU/L 40 (H) 35 (H)   Alk Phos 44 - 121 IU/L 52 61   Bili, Total 0.0 - 1.2 mg/dL 0.3 0.4   Bili, Direct 0.00 - 0.40 mg/dL 0.13 0.15   Albumin 3.8 - 4.8 g/dL 4.7 5.0 (H)   BUN 8 - 27 mg/dL 23 18   Creat 0.57 - 1.00 mg/dL 0.79 0.68   Na 134 - 144 mmol/L 138 141   K 3.5 - 5.2 mmol/L 4.6 4.6   Cl 96 - 106 mmol/L 95 (L) 100   CO2 20 - 29 mmol/L 24 23   Glucose 65 - 99 mg/dL 283 (H) 208 (H)     Cancer Screening 11/9/2021 4/14/2021   AFP, Serum 5.6 5.7   AFP-L3% 11.4 (H) 11.5 (H)     SEROLOGIES:  Serologies Latest Ref Rng & Units 4/14/2021   Hep A Ab, Total Negative Positive (A)   Hep B Surface Ag Negative Negative   Hep B Core Ab, Total Negative Negative   Hep B Surface AB QL  Non Reactive   Hep C Ab 0.0 - 0.9 s/co ratio <0.1   Ferritin 15 - 150 ng/mL 19   Iron % Saturation 15 - 55 % 8 (LL)       LIVER HISTOLOGY:  5/2021. Fib[facility]. EkPa was 27.8. IQR/med 32%. . The results suggested a fibrosis level of F4. The CAP score suggests there is hepatic steatosis. 6/2021. Slides reviewed by MLS. GUZMAN. 33-50% macrovesicualr and micovesicular steatosis, mild inflammation, mild ballooning, Stage 4 fibrosis. MICHELLE (211). ENDOSCOPIC PROCEDURES:  3/2021. EGD performed by Dr. Marcell Figueroa. No esophageal varices. No gastric varices. No portal gastropathy. Gastritis. RADIOLOGY:  2/2021. CT scan abdomen with IV contrast. Changes consistent with cirrhosis. No liver mass lesions. No dilated bile ducts. No ascites. 5/2021. Ultrasound of liver. Echogenic consistent with cirrhosis. No liver mass lesions. No dilated bile ducts. No ascites. 12/2021. Ultrasound of liver. Echogenic consistent with cirrhosis. 1.1 cm hypoechoic nodular lesion in the right hepatic lobe, too small to characterize. No dilated bile ducts. No ascites. OTHER TESTING:  Not available or performed    FOLLOW-UP:  All of the issues listed above in the Assessment and Plan were discussed with the patient. All questions were answered. The patient expressed a clear understanding of the above. [address] in 3 months for routine monitoring.        Tom Kidney, AGPCNP-BC  [address] Liver [place] [facility]  [address], [address] [facility], [address]  [phone]

## 2022-05-11 LAB
AFP L3 MFR SERPL: 11.4 % (ref 0–9.9)
AFP SERPL-MCNC: 5.2 NG/ML (ref 0–9.2)
ALBUMIN SERPL-MCNC: 4.7 G/DL (ref 3.8–4.8)
ALP SERPL-CCNC: 52 IU/L (ref 44–121)
ALT SERPL-CCNC: 40 IU/L (ref 0–32)
AST SERPL-CCNC: 30 IU/L (ref 0–40)
BASOPHILS # BLD AUTO: 0 X10E3/UL (ref 0–0.2)
BASOPHILS NFR BLD AUTO: 0 %
BILIRUB DIRECT SERPL-MCNC: 0.13 MG/DL (ref 0–0.4)
BILIRUB SERPL-MCNC: 0.3 MG/DL (ref 0–1.2)
BUN SERPL-MCNC: 23 MG/DL (ref 8–27)
BUN/CREAT SERPL: 29 (ref 12–28)
CALCIUM SERPL-MCNC: 10.1 MG/DL (ref 8.7–10.3)
CHLORIDE SERPL-SCNC: 95 MMOL/L (ref 96–106)
CO2 SERPL-SCNC: 24 MMOL/L (ref 20–29)
CREAT SERPL-MCNC: 0.79 MG/DL (ref 0.57–1)
EGFR: 84 ML/MIN/1.73
EOSINOPHIL # BLD AUTO: 0.1 X10E3/UL (ref 0–0.4)
EOSINOPHIL NFR BLD AUTO: 2 %
ERYTHROCYTE [DISTWIDTH] IN BLOOD BY AUTOMATED COUNT: 13.6 % (ref 11.7–15.4)
GLUCOSE SERPL-MCNC: 283 MG/DL (ref 65–99)
HCT VFR BLD AUTO: 42.9 % (ref 34–46.6)
HGB BLD-MCNC: 13.6 G/DL (ref 11.1–15.9)
IMM GRANULOCYTES # BLD AUTO: 0 X10E3/UL (ref 0–0.1)
IMM GRANULOCYTES NFR BLD AUTO: 0 %
INR PPP: 1 (ref 0.9–1.2)
LYMPHOCYTES # BLD AUTO: 1.9 X10E3/UL (ref 0.7–3.1)
LYMPHOCYTES NFR BLD AUTO: 40 %
MCH RBC QN AUTO: 27.6 PG (ref 26.6–33)
MCHC RBC AUTO-ENTMCNC: 31.7 G/DL (ref 31.5–35.7)
MCV RBC AUTO: 87 FL (ref 79–97)
MONOCYTES # BLD AUTO: 0.6 X10E3/UL (ref 0.1–0.9)
MONOCYTES NFR BLD AUTO: 11 %
NEUTROPHILS # BLD AUTO: 2.2 X10E3/UL (ref 1.4–7)
NEUTROPHILS NFR BLD AUTO: 47 %
PLATELET # BLD AUTO: 200 X10E3/UL (ref 150–450)
POTASSIUM SERPL-SCNC: 4.6 MMOL/L (ref 3.5–5.2)
PROT SERPL-MCNC: 7.8 G/DL (ref 6–8.5)
PROTHROMBIN TIME: 10.6 SEC (ref 9.1–12)
RBC # BLD AUTO: 4.92 X10E6/UL (ref 3.77–5.28)
SODIUM SERPL-SCNC: 138 MMOL/L (ref 134–144)
WBC # BLD AUTO: 4.8 X10E3/UL (ref 3.4–10.8)

## 2022-05-11 RX ORDER — DIAZEPAM 5 MG/1
TABLET ORAL
Qty: 2 TABLET | Refills: 0 | Status: SHIPPED | OUTPATIENT
Start: 2022-05-11 | End: 2022-08-09

## 2022-05-25 ENCOUNTER — HOSPITAL ENCOUNTER (OUTPATIENT)
Dept: MRI IMAGING | Age: 62
Discharge: HOME | End: 2022-05-25
Attending: NURSE PRACTITIONER
Payer: MEDICAID

## 2022-05-25 VITALS — BODY MASS INDEX: 29.18 KG/M2 | WEIGHT: 157 LBS

## 2022-05-25 DIAGNOSIS — K76.9 LIVER LESION: ICD-10-CM

## 2022-05-25 DIAGNOSIS — K74.60 CIRRHOSIS OF LIVER WITHOUT ASCITES, UNSPECIFIED HEPATIC CIRRHOSIS TYPE (HCC): ICD-10-CM

## 2022-05-25 PROCEDURE — 74011250636 HC RX REV CODE- 250/636: Performed by: NURSE PRACTITIONER

## 2022-05-25 PROCEDURE — A9577 INJ MULTIHANCE: HCPCS | Performed by: NURSE PRACTITIONER

## 2022-05-25 PROCEDURE — 74183 MRI ABD W/O CNTR FLWD CNTR: CPT

## 2022-05-25 RX ADMIN — GADOBENATE DIMEGLUMINE 15 ML: 529 INJECTION, SOLUTION INTRAVENOUS at 16:23

## 2022-08-09 ENCOUNTER — OFFICE VISIT (OUTPATIENT)
Dept: HEMATOLOGY | Age: 62
End: 2022-08-09
Payer: MEDICAID

## 2022-08-09 ENCOUNTER — HOSPITAL ENCOUNTER (OUTPATIENT)
Dept: LAB | Age: 62
Discharge: HOME | End: 2022-08-09

## 2022-08-09 VITALS
WEIGHT: 164 LBS | OXYGEN SATURATION: 97 % | HEIGHT: 62 IN | HEART RATE: 57 BPM | TEMPERATURE: 98.4 F | BODY MASS INDEX: 30.18 KG/M2 | SYSTOLIC BLOOD PRESSURE: 130 MMHG | DIASTOLIC BLOOD PRESSURE: 63 MMHG

## 2022-08-09 DIAGNOSIS — K74.60 HEPATIC CIRRHOSIS, UNSPECIFIED HEPATIC CIRRHOSIS TYPE, UNSPECIFIED WHETHER ASCITES PRESENT (HCC): Primary | ICD-10-CM

## 2022-08-09 LAB — XX-LABCORP SPECIMEN COL,LCBCF: NORMAL

## 2022-08-09 PROCEDURE — 99001 SPECIMEN HANDLING PT-LAB: CPT

## 2022-08-09 PROCEDURE — 99214 OFFICE O/P EST MOD 30 MIN: CPT | Performed by: NURSE PRACTITIONER

## 2022-08-09 RX ORDER — SPIRONOLACTONE 100 MG/1
100 TABLET, FILM COATED ORAL DAILY
Qty: 14 TABLET | Refills: 0 | Status: SHIPPED | OUTPATIENT
Start: 2022-08-09 | End: 2022-08-17

## 2022-08-09 RX ORDER — FUROSEMIDE 40 MG/1
40 TABLET ORAL DAILY
Qty: 14 TABLET | Refills: 0 | Status: SHIPPED | OUTPATIENT
Start: 2022-08-09 | End: 2022-08-17

## 2022-08-09 NOTE — PROGRESS NOTES
[address] [facility], MD, [place], [facility] [place]      CAITLIN Silva [facility]-BC     Kelly Meek [facility]   KWAKU Ortiz [facility]       Colby Palacios [address]    at [address], [facility] Malachi Abernathy  22.    [phone]    FAX: [address] [place] [address]    at [address], [address], [address]    [phone]    FAX: [phone]     Patient Care Team:  Joel Allen MD as PCP - General (Family Medicine)  Belinda Farah MD (Gastroenterology)  Rory Fairchild NP (Nurse Practitioner)      Problem List  Date Reviewed: 5/11/2022            Codes Class Noted    GUZMAN (nonalcoholic steatohepatitis) ICD-10-CM: K75.81  ICD-9-CM: 571.8  7/4/2021        Pancreatic insufficiency ICD-10-CM: K86.89  ICD-9-CM: 577.8  5/17/2021        Gastroparesis ICD-10-CM: K31.84  ICD-9-CM: 536.3  4/14/2021        Controlled type 2 diabetes mellitus, with long-term current use of insulin ([facility] ICD-10-CM: E11.9, Z79.4  ICD-9-CM: 250.00, V58.67  4/14/2021        Hypertension ICD-10-CM: I10  ICD-9-CM: 401.9  4/14/2021        Asthma ICD-10-CM: J45.909  ICD-9-CM: 493.90  4/14/2021        Hypothyroidism ICD-10-CM: E03.9  ICD-9-CM: 244.9  4/14/2021        Cirrhosis ([facility] ICD-10-CM: K74.60  ICD-9-CM: 571.5  4/14/2021        Cutaneous lupus erythematosus ICD-10-CM: L93.2  ICD-9-CM: 695.4  4/14/2021        Lumbar radiculopathy ICD-10-CM: M54.16  ICD-9-CM: 724.4  9/14/2020        Nephrolithiasis ICD-10-CM: N20.0  ICD-9-CM: 592.0  8/14/2012           Carey Jimenes is being seen at [address] for management of cirrhosis secondary to non-alcoholic steatohepatitis (GUZMAN).   The active problem list, all pertinent past medical history, medications, liver histology, radiologic findings and laboratory findings related to the liver disorder were reviewed and discussed with the patient. The patient is a 60 y.o.  female who was found to have chronic liver disease and cirrhosis in 2/2021 when she had a CT scan. The patient underwent a liver biopsy in 6/2021. This demonstrates GUZMAN with cirrhosis. The most recent imaging of the liver was MRI performed in 5/2022. Results suggest cirrhosis. No liver lesion. The patient has not developed any of the major complications of cirrhosis to date. The patient denies having any symptoms which could be attributed to the liver disorder. The patient is not experiencing the following symptoms which are commonly seen in this liver disorder:   fatigue, pain in the right side over the liver, problems concentrating. The patient completes all daily activities without any functional       ASSESSMENT AND PLAN:  Cirrhosis  The diagnosis of cirrhosis is based upon liver biopsy, imaging, Fibroscan  Cirrhosis is secondary to GUZMAN. The patient has normal liver function. The patient has never developed any complications of cirrhosis to date. The CTP is 5. Child class A. The MELD score is 6. GUZMAN  The diagnosis is based upon liver biopsy, imaging, Fiboscan CAP score, features of metabolic syndrome, serologic studies that are negative for other causes of chronic liver disease. A liver biopsy in 6/2021 demonstrated GUZMAN with moderate steatosis, mild inflammation, mild ballooning, MICHELLE (211) and cirrhosis. Fibroscan performed in 5/2021 was 27.8 with CAP score 376 suggesting fatty liver with cirrhosis. If the patient loses 20% of current body weight, which is 32 pounds, down to a weight of 130 pounds, all steatosis will have resolved. Once all steatosis has resolved all inflammation will resolve.   Then all fibrosis will gradually resolve and the liver could eventually be normal.    There is currently no FDA approved medical treatment for fatty liver, NALFD or GUZMAN. The only medical treatments for GUZMAN are through clinical trials. The patient is not currently eligible to enter a clinical trial for treatment of GUZMAN because of cirrhosis. Ascites   The patient complains of swelling of the abdomen. Discussed that this might not be fluid. MRI did not show ascites  We will try a two week trial of step 1 diuretics. The patient will notify the office if medication was effective. If so she is aware that labs will need to be repeated 4 weeks after starting medication. Counseling for diet and weight loss in patients with confirmed or suspected NAFLD  The patient was counseled regarding diet and exercise to achieve weight loss. The best diet for patients with fatty liver is one very low in carbohydrates and enriched with protein such as an Park[employer] program.      The patient was told not to consume any food products and drinks containing fructose as this enhances hepatic fat synthesis. There is no medication or vitamin supplements that we advocate for GUZMAN. Using glitazones in patients without diabetes mellitus has been shown to reduce fat content in the liver but has no effect on fibrosis and is associated with weight gain. Vitamin E has also been used but the data is not very good and most experts no longer advocate this. Screening for Esophageal varices   The patient does not have esophageal or gastric varices. The last EGD to assess for varices was performed in 3/2021. Next EGD to assess for varices is due in 3/2024. Hepatic encephalopathy   Overt HE has not developed to date. There is no need for treatment with lactulose and/or Xifaxan at this time. There is no need to restrict dietary protein at this time. Screening for Hepatocellular Carcinoma  [address] screening was performed in 5/2022 and does not suggest [address]     Ultrasound will be scheduled. Treatment of other medical problems in patients with chronic liver disease  The patient has cirrhosis and should avoid taking NSAIDs which are associated with a higher rate of developing SUDEEP. The patient can take any medications utilized for treatment of DM, statins to treat hypercholesterolemia    Normal doses of acetaminophen, as recommended on the label of the bottle, are not hepatotoxic except in the setting of daily alcohol use, even in patients with cirrhosis and can be utilized for pain. Counseling for alcohol in patients with chronic liver disease  The patient was counseled regarding alcohol consumption and the effect of alcohol on chronic liver disease. The patient has cirrhosis and was advised to be abstinent from all alcohol including non-alcoholic beer which does contain some alcohol. The patient does not consume any significant amount of alcohol. Osteoporosis  The risk of osteoporosis is increased in patients with cirrhosis. DEXA bone density to assess for osteoporosis has not been performed. This should be ordered by the patients primary care physician. Vaccinations   Vaccination for viral hepatitis B is recommended since the patient has no serologic evidence of previous exposure or vaccination with immunity. Vaccination for viral hepatitis A is not needed. The patient has serologic evidence of prior exposure or vaccination with immunity. Routine vaccinations against other bacterial and viral agents can be performed as indicated. Annual flu vaccination should be administered if indicated. ALLERGIES  Allergies   Allergen Reactions    Lisinopril Angioedema    Adhesive Tape-Silicones Other (comments)     welps    Niacin Other (comments)     Flushing      Vicodin [Hydrocodone-Acetaminophen] Itching       MEDICATIONS  Current Outpatient Medications   Medication Sig    spironolactone (ALDACTONE) 100 mg tablet Take 1 Tablet by mouth in the morning.     furosemide (LASIX) 40 mg tablet Take 1 Tablet by mouth in the morning. metoprolol succinate (TOPROL-XL) 25 mg XL tablet Take  by mouth daily. acetaminophen (TYLENOL) 500 mg tablet Take 1,000 mg by mouth every six (6) hours as needed for Pain. venlafaxine-SR (EFFEXOR-XR) 150 mg capsule Take 150 mg by mouth daily. gabapentin (NEURONTIN) 300 mg capsule Take 300 mg by mouth two (2) times a day. metFORMIN (GLUCOPHAGE) 1,000 mg tablet Take 1,000 mg by mouth two (2) times daily (with meals). amLODIPine (NORVASC) 5 mg tablet Take 5 mg by mouth daily. glipiZIDE SR (GLUCOTROL XL) 5 mg CR tablet Take 5 mg by mouth two (2) times a day. fenofibrate nanocrystallized (TRICOR) 145 mg tablet Take 145 mg by mouth daily. hydrOXYzine HCL (ATARAX) 25 mg tablet Take 25 mg by mouth nightly. zolpidem (AMBIEN) 5 mg tablet Take 5 mg by mouth nightly. ARIPiprazole (ABILIFY) 2 mg tablet Take 2 mg by mouth nightly. fluticasone propionate (FLOVENT HFA) 44 mcg/actuation inhaler Take 2 Puffs by inhalation two (2) times a day. insulin aspart protamine/insulin aspart (NOVOLOG MIX 70/30) 100 unit/mL (70-30) injection 75 Units by SubCUTAneous route two (2) times a day. levothyroxine (SYNTHROID) 100 mcg tablet Take 100 mcg by mouth Daily (before breakfast). simvastatin (ZOCOR) 40 mg tablet Take 40 mg by mouth nightly. cloNIDine (CATAPRESS) 0.2 mg tablet Take 0.2 mg by mouth nightly. albuterol (PROVENTIL HFA, VENTOLIN HFA) 90 mcg/actuation inhaler Take 2 Puffs by inhalation every four (4) hours as needed. No current facility-administered medications for this visit. SYSTEM REVIEW NOT RELATED TO LIVER DISEASE OR REVIEWED ABOVE:  Constitution systems: Negative for fever, chills, weight gain, weight loss. Eyes: Negative for visual changes. ENT: Negative for sore throat, painful swallowing. Respiratory: Negative for cough, hemoptysis, SOB. Cardiology: Negative for chest pain, palpitations.   GI: Negative for constipation or diarrhea. : Negative for urinary frequency, dysuria, hematuria, nocturia. Skin: Negative for rash. Hematology: Negative for easy bruising, blood clots. Musculo-skelatal: Negative for back pain, muscle pain, weakness. Neurologic: Negative for headaches, dizziness, vertigo, memory problems not related to HE. Psychology: Negative for anxiety, depression. FAMILY HISTORY:  The father  of COPD. The mother  of heart failure. The following family members have liver disease: father had fatty liver    SOCIAL HISTORY:  The patient is . The patient has 2 children, and 17 grandchildren, 1 great grandchild. The patient stopped using tobacco products in . The patient has never consumed significant amounts of alcohol. The patient does not work. PHYSICAL EXAMINATION:  Visit Vitals  /63   Pulse (!) 57   Temp 98.4 °F (36.9 °C)   Ht 5' 1.5\" (1.562 m)   Wt 164 lb (74.4 kg)   SpO2 97%   BMI 30.49 kg/m²     General: No acute distress. Eyes: Sclera anicteric. ENT: No oral lesions. Thyroid normal.  Nodes: No adenopathy. Skin: No spider angiomata. No jaundice. No palmar erythema. Respiratory: Lungs clear to auscultation. Cardiovascular: Regular heart rate. No murmurs. No JVD. Abdomen: Soft non-tender. Liver size normal to percussion/palpation. Spleen not palpable. No obvious ascites. Extremities: No edema. No muscle wasting. No gross arthritic changes. Neurologic: Alert and oriented. Cranial nerves grossly intact. No asterixis.     LABORATORY STUDIES:  Liver [place] of [address] Units 5/10/2022 2021   WBC 3.4 - 10.8 x10E3/uL 4.8 4.3   ANC 1.4 - 7.0 x10E3/uL 2.2 2.3   HGB 11.1 - 15.9 g/dL 13.6 13.9    - 450 x10E3/uL 200 153   INR 0.9 - 1.2 1.0    AST 0 - 40 IU/L 30 34   ALT 0 - 32 IU/L 40 (H) 35 (H)   Alk Phos 44 - 121 IU/L 52 61   Bili, Total 0.0 - 1.2 mg/dL 0.3 0.4   Bili, Direct 0.00 - 0.40 mg/dL 0.13 0.15   Albumin 3.8 - 4.8 g/dL 4.7 5.0 (H)   BUN 8 - 27 mg/dL 23 18   Creat 0.57 - 1.00 mg/dL 0.79 0.68   Na 134 - 144 mmol/L 138 141   K 3.5 - 5.2 mmol/L 4.6 4.6   Cl 96 - 106 mmol/L 95 (L) 100   CO2 20 - 29 mmol/L 24 23   Glucose 65 - 99 mg/dL 283 (H) 208 (H)     Cancer Screening 11/9/2021 4/14/2021   AFP, Serum 5.6 5.7   AFP-L3% 11.4 (H) 11.5 (H)     SEROLOGIES:  Serologies Latest Ref Rng & Units 4/14/2021   Hep A Ab, Total Negative Positive (A)   Hep B Surface Ag Negative Negative   Hep B Core Ab, Total Negative Negative   Hep B Surface AB QL  Non Reactive   Hep C Ab 0.0 - 0.9 s/co ratio <0.1   Ferritin 15 - 150 ng/mL 19   Iron % Saturation 15 - 55 % 8 (LL)       LIVER HISTOLOGY:  5/2021. [facility]. EkPa was 27.8. IQR/med 32%. . The results suggested a fibrosis level of F4. The CAP score suggests there is hepatic steatosis. 6/2021. Slides reviewed by MLS. GUZMAN. 33-50% macrovesicualr and micovesicular steatosis, mild inflammation, mild ballooning, Stage 4 fibrosis. MICHELLE (211). ENDOSCOPIC PROCEDURES:  3/2021. EGD performed by Dr. Jeffery Navarro. No esophageal varices. No gastric varices. No portal gastropathy. Gastritis. RADIOLOGY:  2/2021. CT scan abdomen with IV contrast. Changes consistent with cirrhosis. No liver mass lesions. No dilated bile ducts. No ascites. 5/2021. Ultrasound of liver. Echogenic consistent with cirrhosis. No liver mass lesions. No dilated bile ducts. No ascites. 12/2021. Ultrasound of liver. Echogenic consistent with cirrhosis. 1.1 cm hypoechoic nodular lesion in the right hepatic lobe, too small to characterize. No dilated bile ducts. No ascites. 5/2022. Dynamic MRI liver. Changes consistent with cirrhosis. No liver mass lesions. No dilated bile ducts. No bile duct strictures. No ascites.       OTHER TESTING:  Not available or performed    FOLLOW-UP:  All of the issues listed above in the Assessment and Plan were discussed with the patient. All questions were answered. The patient expressed a clear understanding of the above. [facility] 87 in 3 months for routine monitoring.        JASON Gaines-BC  [address] Liver [place] [facility]  [address] [facility], [address]  [address], [address]  [phone]

## 2022-08-10 LAB
ALBUMIN SERPL-MCNC: 4.8 G/DL (ref 3.8–4.8)
ALP SERPL-CCNC: 54 IU/L (ref 44–121)
ALT SERPL-CCNC: 35 IU/L (ref 0–32)
AST SERPL-CCNC: 35 IU/L (ref 0–40)
BASOPHILS # BLD AUTO: 0 X10E3/UL (ref 0–0.2)
BASOPHILS NFR BLD AUTO: 1 %
BILIRUB DIRECT SERPL-MCNC: 0.13 MG/DL (ref 0–0.4)
BILIRUB SERPL-MCNC: 0.3 MG/DL (ref 0–1.2)
BUN SERPL-MCNC: 19 MG/DL (ref 8–27)
BUN/CREAT SERPL: 22 (ref 12–28)
CALCIUM SERPL-MCNC: 10.7 MG/DL (ref 8.7–10.3)
CHLORIDE SERPL-SCNC: 94 MMOL/L (ref 96–106)
CO2 SERPL-SCNC: 24 MMOL/L (ref 20–29)
CREAT SERPL-MCNC: 0.85 MG/DL (ref 0.57–1)
EGFR: 77 ML/MIN/1.73
EOSINOPHIL # BLD AUTO: 0.1 X10E3/UL (ref 0–0.4)
EOSINOPHIL NFR BLD AUTO: 2 %
ERYTHROCYTE [DISTWIDTH] IN BLOOD BY AUTOMATED COUNT: 13.2 % (ref 11.7–15.4)
GLUCOSE SERPL-MCNC: 217 MG/DL (ref 65–99)
HCT VFR BLD AUTO: 39.2 % (ref 34–46.6)
HGB BLD-MCNC: 12.4 G/DL (ref 11.1–15.9)
IMM GRANULOCYTES # BLD AUTO: 0 X10E3/UL (ref 0–0.1)
IMM GRANULOCYTES NFR BLD AUTO: 0 %
INR PPP: 1 (ref 0.9–1.2)
LYMPHOCYTES # BLD AUTO: 1.5 X10E3/UL (ref 0.7–3.1)
LYMPHOCYTES NFR BLD AUTO: 40 %
MCH RBC QN AUTO: 27.3 PG (ref 26.6–33)
MCHC RBC AUTO-ENTMCNC: 31.6 G/DL (ref 31.5–35.7)
MCV RBC AUTO: 86 FL (ref 79–97)
MONOCYTES # BLD AUTO: 0.5 X10E3/UL (ref 0.1–0.9)
MONOCYTES NFR BLD AUTO: 13 %
NEUTROPHILS # BLD AUTO: 1.6 X10E3/UL (ref 1.4–7)
NEUTROPHILS NFR BLD AUTO: 44 %
PLATELET # BLD AUTO: 171 X10E3/UL (ref 150–450)
POTASSIUM SERPL-SCNC: 5.1 MMOL/L (ref 3.5–5.2)
PROT SERPL-MCNC: 7.4 G/DL (ref 6–8.5)
PROTHROMBIN TIME: 11 SEC (ref 9.1–12)
RBC # BLD AUTO: 4.54 X10E6/UL (ref 3.77–5.28)
SODIUM SERPL-SCNC: 134 MMOL/L (ref 134–144)
WBC # BLD AUTO: 3.7 X10E3/UL (ref 3.4–10.8)

## 2022-08-17 DIAGNOSIS — K74.60 CIRRHOSIS OF LIVER WITHOUT ASCITES, UNSPECIFIED HEPATIC CIRRHOSIS TYPE (HCC): Primary | ICD-10-CM

## 2022-08-17 RX ORDER — SPIRONOLACTONE 100 MG/1
100 TABLET, FILM COATED ORAL DAILY
Qty: 90 TABLET | Refills: 3 | Status: SHIPPED | OUTPATIENT
Start: 2022-08-17

## 2022-10-31 ENCOUNTER — TRANSCRIBE ORDER (OUTPATIENT)
Dept: REGISTRATION | Age: 62
End: 2022-10-31

## 2022-10-31 ENCOUNTER — HOSPITAL ENCOUNTER (OUTPATIENT)
Dept: LAB | Age: 62
Discharge: HOME | End: 2022-10-31

## 2022-10-31 ENCOUNTER — HOSPITAL ENCOUNTER (OUTPATIENT)
Dept: NON INVASIVE DIAGNOSTICS | Age: 62
Discharge: HOME | End: 2022-10-31
Payer: MEDICAID

## 2022-10-31 DIAGNOSIS — Z01.818 OTHER SPECIFIED PRE-OPERATIVE EXAMINATION: Primary | ICD-10-CM

## 2022-10-31 DIAGNOSIS — Z01.818 OTHER SPECIFIED PRE-OPERATIVE EXAMINATION: ICD-10-CM

## 2022-10-31 LAB
ATRIAL RATE: 72 BPM
CALCULATED P AXIS, ECG09: 68 DEGREES
CALCULATED R AXIS, ECG10: 62 DEGREES
CALCULATED T AXIS, ECG11: 74 DEGREES
DIAGNOSIS, 93000: NORMAL
P-R INTERVAL, ECG05: 136 MS
Q-T INTERVAL, ECG07: 428 MS
QRS DURATION, ECG06: 120 MS
QTC CALCULATION (BEZET), ECG08: 468 MS
VENTRICULAR RATE, ECG03: 72 BPM
XX-LABCORP SPECIMEN COL,LCBCF: NORMAL

## 2022-10-31 PROCEDURE — 99001 SPECIMEN HANDLING PT-LAB: CPT

## 2022-10-31 PROCEDURE — 93005 ELECTROCARDIOGRAM TRACING: CPT

## 2023-01-24 ENCOUNTER — OFFICE VISIT (OUTPATIENT)
Dept: HEMATOLOGY | Age: 63
End: 2023-01-24
Payer: MEDICAID

## 2023-01-24 ENCOUNTER — HOSPITAL ENCOUNTER (OUTPATIENT)
Dept: LAB | Age: 63
Discharge: HOME | End: 2023-01-24

## 2023-01-24 VITALS
BODY MASS INDEX: 30.11 KG/M2 | SYSTOLIC BLOOD PRESSURE: 110 MMHG | HEART RATE: 85 BPM | OXYGEN SATURATION: 97 % | DIASTOLIC BLOOD PRESSURE: 50 MMHG | TEMPERATURE: 96.9 F | WEIGHT: 162 LBS

## 2023-01-24 DIAGNOSIS — R18.8 CIRRHOSIS OF LIVER WITH ASCITES, UNSPECIFIED HEPATIC CIRRHOSIS TYPE (HCC): Primary | ICD-10-CM

## 2023-01-24 DIAGNOSIS — K74.60 CIRRHOSIS OF LIVER WITH ASCITES, UNSPECIFIED HEPATIC CIRRHOSIS TYPE (HCC): Primary | ICD-10-CM

## 2023-01-24 LAB — XX-LABCORP SPECIMEN COL,LCBCF: NORMAL

## 2023-01-24 PROCEDURE — 3074F SYST BP LT 130 MM HG: CPT | Performed by: NURSE PRACTITIONER

## 2023-01-24 PROCEDURE — 99214 OFFICE O/P EST MOD 30 MIN: CPT | Performed by: NURSE PRACTITIONER

## 2023-01-24 PROCEDURE — 99001 SPECIMEN HANDLING PT-LAB: CPT

## 2023-01-24 PROCEDURE — 3078F DIAST BP <80 MM HG: CPT | Performed by: NURSE PRACTITIONER

## 2023-01-24 NOTE — PROGRESS NOTES
[facility], MD, FACP, [address], [place]      CAITLIN Cunningham, PCNP-BC   Meenakshi Mchugh, [facility]-   Marysol Vazquez, FNP-C  Alivia Sultana, FNP-C   Bharath Castillo, AGPCNP-BC      [address]   at [place] [id number] [facility], [address] [address]  22.   [phone]   FAX: [phone]   at [address], [address] [place] [address], [address]   [phone]   FAX: [phone]       Patient Care Team:  Nemesio Bustamante MD as PCP - General (Family Medicine)  Saúl Sky MD (Gastroenterology)  Rashid Delcid NP (Nurse Practitioner)      Problem List  Date Reviewed: 1/24/2023            Codes Class Noted    GUZMAN (nonalcoholic steatohepatitis) ICD-10-CM: K75.81  ICD-9-CM: 571.8  7/4/2021        Pancreatic insufficiency ICD-10-CM: K86.89  ICD-9-CM: 577.8  5/17/2021        Gastroparesis ICD-10-CM: K31.84  ICD-9-CM: 536.3  4/14/2021        Controlled type 2 diabetes mellitus, with long-term current use of insulin ([facility] ICD-10-CM: E11.9, Z79.4  ICD-9-CM: 250.00, V58.67  4/14/2021        Hypertension ICD-10-CM: I10  ICD-9-CM: 401.9  4/14/2021        Asthma ICD-10-CM: J45.909  ICD-9-CM: 493.90  4/14/2021        Hypothyroidism ICD-10-CM: E03.9  ICD-9-CM: 244.9  4/14/2021        Cirrhosis ([facility].) ICD-10-CM: K74.60  ICD-9-CM: 571.5  4/14/2021        Cutaneous lupus erythematosus ICD-10-CM: L93.2  ICD-9-CM: 695.4  4/14/2021        Lumbar radiculopathy ICD-10-CM: M54.16  ICD-9-CM: 724.4  9/14/2020        Nephrolithiasis ICD-10-CM: N20.0  ICD-9-CM: 592.0  8/14/2012           Dimitri Go is being [facility] at [facility] for management of cirrhosis secondary to non-alcoholic steatohepatitis (GUZMAN).   The active problem list, all pertinent past medical history, medications, liver histology, radiologic findings and laboratory findings related to the liver disorder were reviewed and discussed with the patient. The patient is a 60 y.o.  female who was found to have chronic liver disease and cirrhosis in 2/2021 when she had a CT scan. The patient underwent a liver biopsy in 6/2021. This demonstrates GUZMAN with cirrhosis. The most recent imaging of the liver was MRI performed in 5/2022. Results suggest cirrhosis. No liver lesion. The patient has not developed any of the major complications of cirrhosis to date. The patient denies having any symptoms which could be attributed to the liver disorder. The patient is not experiencing the following symptoms which are commonly seen in this liver disorder:   fatigue, pain in the right side over the liver, problems concentrating. The patient completes all daily activities without any functional       ASSESSMENT AND PLAN:  Cirrhosis  The diagnosis of cirrhosis is based upon liver biopsy, imaging, Fibroscan  Cirrhosis is secondary to GUZMAN. The patient has normal liver function. The patient has never developed any complications of cirrhosis to date. Have performed laboratory testing to monitor liver function and degree of liver injury. This included   BMP, hepatic panel, CBC with platelet count. Liver transaminases are normal. ALP is normal.  Liver function is normal.  The platelet count is normal.      The CTP is 5. Child class A. The MELD score is 6. GUZMAN  The diagnosis is based upon liver biopsy, imaging, Fiboscan CAP score, features of metabolic syndrome, serologic studies that are negative for other causes of chronic liver disease. A liver biopsy in 6/2021 demonstrated GUZMAN with moderate steatosis, mild inflammation, mild ballooning, MICHELLE (211) and cirrhosis. Fibroscan performed in 5/2021 was 27.8 with CAP score 376 suggesting fatty liver with cirrhosis.     If the patient loses 20% of current body weight, which is 32 pounds, down to a weight of 130 pounds, all steatosis will have resolved. Once all steatosis has resolved all inflammation will resolve. Then all fibrosis will gradually resolve and the liver could eventually be normal.    There is currently no FDA approved medical treatment for fatty liver, NALFD or GUZMAN. The only medical treatments for GUZMAN are through clinical trials. The patient is not currently eligible to enter a clinical trial for treatment of GUZMAN because of cirrhosis. Ascites   The patient complains of swelling of the abdomen. Discussed that this might not be fluid. MRI did not show ascites  She continues to take step 1 diuretics. Counseling for diet and weight loss in patients with confirmed or suspected NAFLD  The patient was counseled regarding diet and exercise to achieve weight loss. The best diet for patients with fatty liver is one very low in carbohydrates and enriched with protein such as an Park's program.      The patient was told not to consume any food products and drinks containing fructose as this enhances hepatic fat synthesis. There is no medication or vitamin supplements that we advocate for GUZMAN. Using glitazones in patients without diabetes mellitus has been shown to reduce fat content in the liver but has no effect on fibrosis and is associated with weight gain. Vitamin E has also been used but the data is not very good and most experts no longer advocate this. Screening for Esophageal varices   The patient does not have esophageal or gastric varices. The last EGD to assess for varices was performed in 3/2021. Next EGD to assess for varices is due in 3/2024. Hepatic encephalopathy   Overt HE has not developed to date. There is no need for treatment with lactulose and/or Xifaxan at this time. There is no need to restrict dietary protein at this time.       Screening for Hepatocellular Carcinoma  HCC screening was performed in 5/2022 and does not suggest [facility] AFP performed today. Ultrasound will be scheduled. Treatment of other medical problems in patients with chronic liver disease  The patient has cirrhosis and should avoid taking NSAIDs which are associated with a higher rate of developing SUDEEP. The patient can take any medications utilized for treatment of DM, statins to treat hypercholesterolemia    Normal doses of acetaminophen, as recommended on the label of the bottle, are not hepatotoxic except in the setting of daily alcohol use, even in patients with cirrhosis and can be utilized for pain. Counseling for alcohol in patients with chronic liver disease  The patient was counseled regarding alcohol consumption and the effect of alcohol on chronic liver disease. The patient has cirrhosis and was advised to be abstinent from all alcohol including non-alcoholic beer which does contain some alcohol. The patient does not consume any significant amount of alcohol. Osteoporosis  The risk of osteoporosis is increased in patients with cirrhosis. DEXA bone density to assess for osteoporosis has not been performed. This should be ordered by the patients primary care physician. Vaccinations   Vaccination for viral hepatitis B is recommended since the patient has no serologic evidence of previous exposure or vaccination with immunity. Vaccination for viral hepatitis A is not needed. The patient has serologic evidence of prior exposure or vaccination with immunity. Routine vaccinations against other bacterial and viral agents can be performed as indicated. Annual flu vaccination should be administered if indicated.     ALLERGIES  Allergies   Allergen Reactions    Lisinopril Angioedema    Adhesive Tape-Silicones Other (comments)     welps    Niacin Other (comments)     Flushing      Vicodin [Hydrocodone-Acetaminophen] Itching       MEDICATIONS  Current Outpatient Medications   Medication Sig    furosemide (LASIX) 40 mg tablet TAKE 1 TABLET BY MOUTH IN THE MORNING    spironolactone (ALDACTONE) 100 mg tablet Take 1 Tablet by mouth daily. metoprolol succinate (TOPROL-XL) 25 mg XL tablet Take  by mouth daily. acetaminophen (TYLENOL) 500 mg tablet Take 1,000 mg by mouth every six (6) hours as needed for Pain. venlafaxine-SR (EFFEXOR-XR) 150 mg capsule Take 150 mg by mouth daily. gabapentin (NEURONTIN) 300 mg capsule Take 300 mg by mouth two (2) times a day. metFORMIN (GLUCOPHAGE) 1,000 mg tablet Take 1,000 mg by mouth two (2) times daily (with meals). amLODIPine (NORVASC) 5 mg tablet Take 5 mg by mouth daily. glipiZIDE SR (GLUCOTROL XL) 5 mg CR tablet Take 5 mg by mouth two (2) times a day. fenofibrate nanocrystallized (TRICOR) 145 mg tablet Take 145 mg by mouth daily. hydrOXYzine HCL (ATARAX) 25 mg tablet Take 25 mg by mouth nightly. zolpidem (AMBIEN) 5 mg tablet Take 5 mg by mouth nightly. ARIPiprazole (ABILIFY) 2 mg tablet Take 2 mg by mouth nightly. fluticasone propionate (FLOVENT HFA) 44 mcg/actuation inhaler Take 2 Puffs by inhalation two (2) times a day. insulin aspart protamine/insulin aspart (NOVOLOG MIX 70/30) 100 unit/mL (70-30) injection 75 Units by SubCUTAneous route two (2) times a day. levothyroxine (SYNTHROID) 100 mcg tablet Take 100 mcg by mouth Daily (before breakfast). simvastatin (ZOCOR) 40 mg tablet Take 40 mg by mouth nightly. cloNIDine (CATAPRESS) 0.2 mg tablet Take 0.2 mg by mouth nightly. albuterol (PROVENTIL HFA, VENTOLIN HFA) 90 mcg/actuation inhaler Take 2 Puffs by inhalation every four (4) hours as needed. No current facility-administered medications for this visit. SYSTEM REVIEW NOT RELATED TO LIVER DISEASE OR REVIEWED ABOVE:  Constitution systems: Negative for fever, chills, weight gain, weight loss. Eyes: Negative for visual changes. ENT: Negative for sore throat, painful swallowing.    Respiratory: Negative for cough, hemoptysis, SOB.   Cardiology: Negative for chest pain, palpitations. GI:  Negative for constipation or diarrhea. : Negative for urinary frequency, dysuria, hematuria, nocturia. Skin: Negative for rash. Hematology: Negative for easy bruising, blood clots. Musculo-skelatal: Negative for back pain, muscle pain, weakness. Neurologic: Negative for headaches, dizziness, vertigo, memory problems not related to HE. Psychology: Negative for anxiety, depression. FAMILY HISTORY:  The father  of COPD. The mother  of heart failure. The following family members have liver disease: father had fatty liver    SOCIAL HISTORY:  The patient is . The patient has 2 children, and 17 grandchildren, 1 great grandchild. The patient stopped using tobacco products in . The patient has never consumed significant amounts of alcohol. The patient does not work. PHYSICAL EXAMINATION:  Visit Vitals  BP (!) 110/50   Pulse 85   Temp 96.9 °F (36.1 °C) (Tympanic)   Wt 162 lb (73.5 kg)   SpO2 97%   BMI 30.11 kg/m²     General: No acute distress. Eyes: Sclera anicteric. ENT: No oral lesions. Thyroid normal.  Nodes: No adenopathy. Skin: No spider angiomata. No jaundice. No palmar erythema. Respiratory: Lungs clear to auscultation. Cardiovascular: Regular heart rate. No murmurs. No JVD. Abdomen: Soft non-tender. Liver size normal to percussion/palpation. Spleen not palpable. No obvious ascites. Extremities: No edema. No muscle wasting. No gross arthritic changes. Neurologic: Alert and oriented. Cranial nerves grossly intact. No asterixis.     LABORATORY STUDIES:  Liver [place] [address] Units 2023   WBC 3.4 - 10.8 x10E3/uL 5.1 3.7   ANC 1.4 - 7.0 x10E3/uL 2.4 1.6   HGB 11.1 - 15.9 g/dL 12.6 12.4    - 450 x10E3/uL 215 171   INR 0.9 - 1.2  1.0   AST 0 - 40 IU/L 26 35   ALT 0 - 32 IU/L 27 35 (H)   Alk Phos 44 - 121 IU/L 58 54   Bili, Total 0.0 - 1.2 mg/dL 0.4 0.3   Bili, Direct 0.00 - 0.40 mg/dL 0.12 0.13   Albumin 3.8 - 4.8 g/dL 5.1 (H) 4.8   BUN 8 - 27 mg/dL 16 19   Creat 0.57 - 1.00 mg/dL 0.86 0.85   Na 134 - 144 mmol/L 132 (L) 134   K 3.5 - 5.2 mmol/L 4.5 5.1   Cl 96 - 106 mmol/L 91 (L) 94 (L)   CO2 20 - 29 mmol/L 18 (L) 24   Glucose 70 - 99 mg/dL 414 (H) 217 (H)     Cancer Screening Latest Ref Rng & Units 1/24/2023 5/10/2022   AFP, Serum 0.0 - 9.2 ng/mL 6.0 5.2   AFP-L3% 0.0 - 9.9 % 13.0 (H) 11.4 (H)     SEROLOGIES:  Serologies Latest Ref Rng & Units 4/14/2021   Hep A Ab, Total Negative Positive (A)   Hep B Surface Ag Negative Negative   Hep B Core Ab, Total Negative Negative   Hep B Surface AB QL  Non Reactive   Hep C Ab 0.0 - 0.9 s/co ratio <0.1   Ferritin 15 - 150 ng/mL 19   Iron % Saturation 15 - 55 % 8 (LL)       LIVER HISTOLOGY:  5/2021. [facility] at [facility]. EkPa was 27.8. IQR/med 32%. . The results suggested a fibrosis level of F4. The CAP score suggests there is hepatic steatosis. 6/2021. Slides reviewed by MLS. GUZMAN. 33-50% macrovesicualr and micovesicular steatosis, mild inflammation, mild ballooning, Stage 4 fibrosis. MICHELLE (211). ENDOSCOPIC PROCEDURES:  3/2021. EGD performed by Dr. Esthela Haddad. No esophageal varices. No gastric varices. No portal gastropathy. Gastritis. RADIOLOGY:  12/2021. Ultrasound of liver. Echogenic consistent with cirrhosis. 1.1 cm hypoechoic nodular lesion in the right hepatic lobe, too small to characterize. No dilated bile ducts. No ascites. 5/2022. Dynamic MRI liver. Changes consistent with cirrhosis. No liver mass lesions. No dilated bile ducts. No bile duct strictures. No ascites. OTHER TESTING:  Not available or performed    FOLLOW-UP:  All of the issues listed above in the Assessment and Plan were discussed with the patient. All questions were answered. The patient expressed a clear understanding of the above.     [address] [place] [address] in 6 months for routine monitoring.        JASON Carrasco-BC  [place] Liver [place] [facility]  [address], [address]  [place], [address] [place] [address] Street [address]  [phone]

## 2023-01-26 LAB
AFP L3 MFR SERPL: 13 % (ref 0–9.9)
AFP SERPL-MCNC: 6 NG/ML (ref 0–9.2)
ALBUMIN SERPL-MCNC: 5.1 G/DL (ref 3.8–4.8)
ALP SERPL-CCNC: 58 IU/L (ref 44–121)
ALT SERPL-CCNC: 27 IU/L (ref 0–32)
AST SERPL-CCNC: 26 IU/L (ref 0–40)
BASOPHILS # BLD AUTO: 0 X10E3/UL (ref 0–0.2)
BASOPHILS NFR BLD AUTO: 1 %
BILIRUB DIRECT SERPL-MCNC: 0.12 MG/DL (ref 0–0.4)
BILIRUB SERPL-MCNC: 0.4 MG/DL (ref 0–1.2)
BUN SERPL-MCNC: 16 MG/DL (ref 8–27)
BUN/CREAT SERPL: 19 (ref 12–28)
CALCIUM SERPL-MCNC: 10.7 MG/DL (ref 8.7–10.3)
CHLORIDE SERPL-SCNC: 91 MMOL/L (ref 96–106)
CO2 SERPL-SCNC: 18 MMOL/L (ref 20–29)
CREAT SERPL-MCNC: 0.86 MG/DL (ref 0.57–1)
EGFR: 76 ML/MIN/1.73
EOSINOPHIL # BLD AUTO: 0.1 X10E3/UL (ref 0–0.4)
EOSINOPHIL NFR BLD AUTO: 1 %
ERYTHROCYTE [DISTWIDTH] IN BLOOD BY AUTOMATED COUNT: 12.4 % (ref 11.7–15.4)
GLUCOSE SERPL-MCNC: 414 MG/DL (ref 70–99)
HCT VFR BLD AUTO: 38.6 % (ref 34–46.6)
HGB BLD-MCNC: 12.6 G/DL (ref 11.1–15.9)
IMM GRANULOCYTES # BLD AUTO: 0 X10E3/UL (ref 0–0.1)
IMM GRANULOCYTES NFR BLD AUTO: 0 %
LYMPHOCYTES # BLD AUTO: 2 X10E3/UL (ref 0.7–3.1)
LYMPHOCYTES NFR BLD AUTO: 38 %
MCH RBC QN AUTO: 27.6 PG (ref 26.6–33)
MCHC RBC AUTO-ENTMCNC: 32.6 G/DL (ref 31.5–35.7)
MCV RBC AUTO: 85 FL (ref 79–97)
MONOCYTES # BLD AUTO: 0.6 X10E3/UL (ref 0.1–0.9)
MONOCYTES NFR BLD AUTO: 12 %
NEUTROPHILS # BLD AUTO: 2.4 X10E3/UL (ref 1.4–7)
NEUTROPHILS NFR BLD AUTO: 48 %
PLATELET # BLD AUTO: 215 X10E3/UL (ref 150–450)
POTASSIUM SERPL-SCNC: 4.5 MMOL/L (ref 3.5–5.2)
PROT SERPL-MCNC: 8 G/DL (ref 6–8.5)
RBC # BLD AUTO: 4.57 X10E6/UL (ref 3.77–5.28)
SODIUM SERPL-SCNC: 132 MMOL/L (ref 134–144)
WBC # BLD AUTO: 5.1 X10E3/UL (ref 3.4–10.8)

## 2023-02-03 ENCOUNTER — TRANSCRIBE ORDERS (OUTPATIENT)
Facility: HOSPITAL | Age: 63
End: 2023-02-03

## 2023-02-03 DIAGNOSIS — K74.60 HEPATIC CIRRHOSIS, UNSPECIFIED HEPATIC CIRRHOSIS TYPE, UNSPECIFIED WHETHER ASCITES PRESENT (HCC): Primary | ICD-10-CM

## 2023-03-01 ENCOUNTER — HOSPITAL ENCOUNTER (OUTPATIENT)
Facility: HOSPITAL | Age: 63
Discharge: HOME | End: 2023-03-04
Payer: COMMERCIAL

## 2023-03-01 DIAGNOSIS — K74.60 HEPATIC CIRRHOSIS, UNSPECIFIED HEPATIC CIRRHOSIS TYPE, UNSPECIFIED WHETHER ASCITES PRESENT (HCC): ICD-10-CM

## 2023-03-01 PROCEDURE — 93975 VASCULAR STUDY: CPT

## 2023-06-19 ENCOUNTER — HOSPITAL ENCOUNTER (EMERGENCY)
Facility: HOSPITAL | Age: 63
Discharge: HOME | End: 2023-06-19
Attending: EMERGENCY MEDICINE
Payer: MEDICAID

## 2023-06-19 ENCOUNTER — APPOINTMENT (OUTPATIENT)
Facility: HOSPITAL | Age: 63
End: 2023-06-19
Payer: MEDICAID

## 2023-06-19 VITALS
RESPIRATION RATE: 18 BRPM | SYSTOLIC BLOOD PRESSURE: 117 MMHG | OXYGEN SATURATION: 97 % | HEART RATE: 73 BPM | HEIGHT: 62 IN | WEIGHT: 160 LBS | TEMPERATURE: 97.3 F | DIASTOLIC BLOOD PRESSURE: 55 MMHG | BODY MASS INDEX: 29.44 KG/M2

## 2023-06-19 DIAGNOSIS — R42 DIZZINESS: Primary | ICD-10-CM

## 2023-06-19 DIAGNOSIS — R42 VERTIGO: ICD-10-CM

## 2023-06-19 DIAGNOSIS — R42 LIGHTHEADEDNESS: ICD-10-CM

## 2023-06-19 LAB
ALBUMIN SERPL-MCNC: 4.5 G/DL (ref 3.4–5)
ALBUMIN/GLOB SERPL: 1.1 (ref 0.8–1.7)
ALP SERPL-CCNC: 53 U/L (ref 45–117)
ALT SERPL-CCNC: 59 U/L (ref 13–56)
ANION GAP SERPL CALC-SCNC: 7 MMOL/L (ref 3–18)
AST SERPL-CCNC: 44 U/L (ref 10–38)
BASOPHILS # BLD: 0 K/UL (ref 0–0.1)
BASOPHILS NFR BLD: 0 % (ref 0–2)
BILIRUB SERPL-MCNC: 0.5 MG/DL (ref 0.2–1)
BUN SERPL-MCNC: 19 MG/DL (ref 7–18)
BUN/CREAT SERPL: 19 (ref 12–20)
CALCIUM SERPL-MCNC: 10.4 MG/DL (ref 8.5–10.1)
CHLORIDE SERPL-SCNC: 103 MMOL/L (ref 100–111)
CO2 SERPL-SCNC: 26 MMOL/L (ref 21–32)
CREAT SERPL-MCNC: 1 MG/DL (ref 0.6–1.3)
DIFFERENTIAL METHOD BLD: ABNORMAL
EKG ATRIAL RATE: 72 BPM
EKG DIAGNOSIS: NORMAL
EKG P AXIS: 38 DEGREES
EKG P-R INTERVAL: 138 MS
EKG Q-T INTERVAL: 422 MS
EKG QRS DURATION: 122 MS
EKG QTC CALCULATION (BAZETT): 462 MS
EKG R AXIS: 7 DEGREES
EKG T AXIS: -6 DEGREES
EKG VENTRICULAR RATE: 72 BPM
EOSINOPHIL # BLD: 0 K/UL (ref 0–0.4)
EOSINOPHIL NFR BLD: 1 % (ref 0–5)
ERYTHROCYTE [DISTWIDTH] IN BLOOD BY AUTOMATED COUNT: 14 % (ref 11.6–14.5)
GLOBULIN SER CALC-MCNC: 4.1 G/DL (ref 2–4)
GLUCOSE BLD STRIP.AUTO-MCNC: 80 MG/DL (ref 70–110)
GLUCOSE SERPL-MCNC: 159 MG/DL (ref 74–99)
HCT VFR BLD AUTO: 41 % (ref 35–45)
HGB BLD-MCNC: 12.7 G/DL (ref 12–16)
IMM GRANULOCYTES # BLD AUTO: 0 K/UL (ref 0–0.04)
IMM GRANULOCYTES NFR BLD AUTO: 0 % (ref 0–0.5)
LYMPHOCYTES # BLD: 2.2 K/UL (ref 0.9–3.6)
LYMPHOCYTES NFR BLD: 36 % (ref 21–52)
MAGNESIUM SERPL-MCNC: 2.3 MG/DL (ref 1.6–2.6)
MCH RBC QN AUTO: 26.6 PG (ref 24–34)
MCHC RBC AUTO-ENTMCNC: 31 G/DL (ref 31–37)
MCV RBC AUTO: 86 FL (ref 78–100)
MONOCYTES # BLD: 0.9 K/UL (ref 0.05–1.2)
MONOCYTES NFR BLD: 14 % (ref 3–10)
NEUTS SEG # BLD: 3 K/UL (ref 1.8–8)
NEUTS SEG NFR BLD: 49 % (ref 40–73)
NRBC # BLD: 0 K/UL (ref 0–0.01)
NRBC BLD-RTO: 0 PER 100 WBC
PLATELET # BLD AUTO: 225 K/UL (ref 135–420)
PMV BLD AUTO: 11.8 FL (ref 9.2–11.8)
POTASSIUM SERPL-SCNC: 4.8 MMOL/L (ref 3.5–5.5)
PROT SERPL-MCNC: 8.6 G/DL (ref 6.4–8.2)
RBC # BLD AUTO: 4.77 M/UL (ref 4.2–5.3)
SODIUM SERPL-SCNC: 136 MMOL/L (ref 136–145)
TROPONIN I SERPL HS-MCNC: 7 NG/L (ref 0–54)
WBC # BLD AUTO: 6.1 K/UL (ref 4.6–13.2)

## 2023-06-19 PROCEDURE — 70450 CT HEAD/BRAIN W/O DYE: CPT

## 2023-06-19 PROCEDURE — 83735 ASSAY OF MAGNESIUM: CPT

## 2023-06-19 PROCEDURE — 84484 ASSAY OF TROPONIN QUANT: CPT

## 2023-06-19 PROCEDURE — 93005 ELECTROCARDIOGRAM TRACING: CPT | Performed by: EMERGENCY MEDICINE

## 2023-06-19 PROCEDURE — 6370000000 HC RX 637 (ALT 250 FOR IP): Performed by: EMERGENCY MEDICINE

## 2023-06-19 PROCEDURE — 82962 GLUCOSE BLOOD TEST: CPT

## 2023-06-19 PROCEDURE — 85025 COMPLETE CBC W/AUTO DIFF WBC: CPT

## 2023-06-19 PROCEDURE — 2580000003 HC RX 258: Performed by: EMERGENCY MEDICINE

## 2023-06-19 PROCEDURE — 99285 EMERGENCY DEPT VISIT HI MDM: CPT

## 2023-06-19 PROCEDURE — 80053 COMPREHEN METABOLIC PANEL: CPT

## 2023-06-19 PROCEDURE — 71045 X-RAY EXAM CHEST 1 VIEW: CPT

## 2023-06-19 RX ORDER — MECLIZINE HCL 12.5 MG/1
25 TABLET ORAL
Status: COMPLETED | OUTPATIENT
Start: 2023-06-19 | End: 2023-06-19

## 2023-06-19 RX ORDER — 0.9 % SODIUM CHLORIDE 0.9 %
1000 INTRAVENOUS SOLUTION INTRAVENOUS ONCE
Status: COMPLETED | OUTPATIENT
Start: 2023-06-19 | End: 2023-06-19

## 2023-06-19 RX ORDER — MECLIZINE HYDROCHLORIDE 25 MG/1
25 TABLET ORAL 3 TIMES DAILY PRN
Qty: 15 TABLET | Refills: 0 | Status: SHIPPED | OUTPATIENT
Start: 2023-06-19 | End: 2023-06-29

## 2023-06-19 RX ADMIN — SODIUM CHLORIDE 1000 ML: 900 INJECTION, SOLUTION INTRAVENOUS at 14:10

## 2023-06-19 RX ADMIN — MECLIZINE 25 MG: 12.5 TABLET ORAL at 15:46

## 2023-06-19 NOTE — ED PROVIDER NOTES
(GLUCOPHAGE) 1000 MG tablet Take 1,000 mg by mouth 2 times daily (with meals)      metoprolol succinate (TOPROL XL) 25 MG extended release tablet Take by mouth daily      simvastatin (ZOCOR) 40 MG tablet Take 40 mg by mouth      spironolactone (ALDACTONE) 100 MG tablet Take 100 mg by mouth daily      venlafaxine (EFFEXOR XR) 150 MG extended release capsule Take 150 mg by mouth daily      zolpidem (AMBIEN) 5 MG tablet Take 5 mg by mouth. DISCONTINUED MEDICATIONS:  Current Discharge Medication List          PATIENT REFERRED TO:  Follow Up with: Darrius Lala MD  [address] [place] [address]  [phone]    Schedule an appointment as soon as possible for a visit   As soon as possible, For follow up from the Emergency [address] [place] [address]    Schedule an appointment as soon as possible for a visit   As needed, For follow up from the Emergency Department, If symptoms worsen    [facility] EMERGENCY DEPT  [address] [place] [address] [phone]    As needed, If symptoms worsen      I Linda iMreles MD am the primary clinician of record. Dragon Disclaimer     Please note that this dictation was completed with [employer]junk[employer], the computer voice recognition software. Quite often unanticipated grammatical, syntax, homophones, and other interpretive errors are inadvertently transcribed by the computer software. Please disregard these errors. Please excuse any errors that have escaped final proofreading.     Linda Mireles MD  (Electronically signed)            Diana Small MD  06/19/23 0651

## 2023-06-19 NOTE — ED TRIAGE NOTES
Pt arrived with c/o increased dizziness and frequency of falls. Pt states she has fell 4-5 times in the last two days. Pt states the dizziness has been worsening the last few weeks. Pt states she did hit her head one fall but denies LOC. Pt denies any other complaints att.  Pt in NAD>

## 2023-06-19 NOTE — DISCHARGE INSTRUCTIONS
Follow-up with neurology. You will need to call to make an appointment. Return to the ED for worsening symptoms or for other concerns.

## 2023-07-27 RX ORDER — SPIRONOLACTONE 100 MG/1
100 TABLET, FILM COATED ORAL DAILY
Qty: 30 TABLET | Refills: 3 | Status: SHIPPED | OUTPATIENT
Start: 2023-07-27

## 2023-08-22 RX ORDER — FUROSEMIDE 40 MG/1
TABLET ORAL
Qty: 30 TABLET | Refills: 0 | Status: SHIPPED | OUTPATIENT
Start: 2023-08-22 | End: 2023-08-24 | Stop reason: SDUPTHER

## 2023-08-24 RX ORDER — FUROSEMIDE 40 MG/1
40 TABLET ORAL EVERY MORNING
Qty: 90 TABLET | Refills: 3 | Status: SHIPPED | OUTPATIENT
Start: 2023-08-24

## 2023-09-25 ENCOUNTER — HOSPITAL ENCOUNTER (OUTPATIENT)
Facility: HOSPITAL | Age: 63
Setting detail: SPECIMEN
Discharge: HOME | End: 2023-09-28

## 2023-09-25 ENCOUNTER — OFFICE VISIT (OUTPATIENT)
Age: 63
End: 2023-09-25
Payer: MEDICAID

## 2023-09-25 VITALS
TEMPERATURE: 98.4 F | SYSTOLIC BLOOD PRESSURE: 110 MMHG | HEIGHT: 61 IN | BODY MASS INDEX: 29.45 KG/M2 | DIASTOLIC BLOOD PRESSURE: 60 MMHG | WEIGHT: 156 LBS | OXYGEN SATURATION: 98 % | HEART RATE: 82 BPM

## 2023-09-25 DIAGNOSIS — K74.60 HEPATIC CIRRHOSIS, UNSPECIFIED HEPATIC CIRRHOSIS TYPE, UNSPECIFIED WHETHER ASCITES PRESENT (HCC): Primary | ICD-10-CM

## 2023-09-25 DIAGNOSIS — K74.60 HEPATIC CIRRHOSIS, UNSPECIFIED HEPATIC CIRRHOSIS TYPE, UNSPECIFIED WHETHER ASCITES PRESENT (HCC): ICD-10-CM

## 2023-09-25 LAB — LABCORP SPECIMEN COLLECTION: NORMAL

## 2023-09-25 PROCEDURE — 99214 OFFICE O/P EST MOD 30 MIN: CPT | Performed by: NURSE PRACTITIONER

## 2023-09-25 PROCEDURE — 3074F SYST BP LT 130 MM HG: CPT | Performed by: NURSE PRACTITIONER

## 2023-09-25 PROCEDURE — 3078F DIAST BP <80 MM HG: CPT | Performed by: NURSE PRACTITIONER

## 2023-09-25 NOTE — PROGRESS NOTES
patients with chronic liver disease  The patient was counseled regarding alcohol consumption and the effect of alcohol on chronic liver disease. The patient has cirrhosis and was advised to be abstinent from all alcohol including non-alcoholic beer which does contain some alcohol. The patient does not consume any significant amount of alcohol. Osteoporosis  The risk of osteoporosis is increased in patients with cirrhosis. DEXA bone density to assess for osteoporosis has not been performed. This should be ordered by the patients primary care physician. Vaccinations   Vaccination for viral hepatitis B is recommended since the patient has no serologic evidence of previous exposure or vaccination with immunity. Vaccination for viral hepatitis A is not needed. The patient has serologic evidence of prior exposure or vaccination with immunity. Routine vaccinations against other bacterial and viral agents can be performed as indicated. Annual flu vaccination should be administered if indicated.     ALLERGIES  Allergies   Allergen Reactions    Lisinopril Angioedema    Adhesive Tape Other (See Comments)     welps    Hydrocodone-Acetaminophen Itching    Niacin Other (See Comments)     Flushing     MEDICATIONS  Current Outpatient Medications   Medication Sig Dispense Refill    furosemide (LASIX) 40 MG tablet Take 1 tablet by mouth every morning 90 tablet 3    spironolactone (ALDACTONE) 100 MG tablet Take 1 tablet by mouth daily 30 tablet 3    acetaminophen (TYLENOL) 500 MG tablet Take 2 tablets by mouth every 6 hours as needed      albuterol sulfate HFA (PROVENTIL;VENTOLIN;PROAIR) 108 (90 Base) MCG/ACT inhaler Inhale 2 puffs into the lungs every 4 hours as needed      amLODIPine (NORVASC) 5 MG tablet Take 1 tablet by mouth daily      ARIPiprazole (ABILIFY) 2 MG tablet Take 1 tablet by mouth      cloNIDine (CATAPRES) 0.2 MG tablet Take 1 tablet by mouth      fenofibrate (TRICOR) 145 MG tablet Take 1 tablet by

## 2023-09-26 LAB
ALBUMIN SERPL-MCNC: 5 G/DL (ref 3.9–4.9)
ALP SERPL-CCNC: 61 IU/L (ref 44–121)
ALT SERPL-CCNC: 33 IU/L (ref 0–32)
AST SERPL-CCNC: 37 IU/L (ref 0–40)
BASOPHILS # BLD AUTO: 0 X10E3/UL (ref 0–0.2)
BASOPHILS NFR BLD AUTO: 1 %
BILIRUB DIRECT SERPL-MCNC: 0.13 MG/DL (ref 0–0.4)
BILIRUB SERPL-MCNC: 0.3 MG/DL (ref 0–1.2)
BUN SERPL-MCNC: 15 MG/DL (ref 8–27)
BUN/CREAT SERPL: 17 (ref 12–28)
CALCIUM SERPL-MCNC: 10.5 MG/DL (ref 8.7–10.3)
CHLORIDE SERPL-SCNC: 93 MMOL/L (ref 96–106)
CO2 SERPL-SCNC: 16 MMOL/L (ref 20–29)
CREAT SERPL-MCNC: 0.88 MG/DL (ref 0.57–1)
EGFRCR SERPLBLD CKD-EPI 2021: 73 ML/MIN/1.73
EOSINOPHIL # BLD AUTO: 0 X10E3/UL (ref 0–0.4)
EOSINOPHIL NFR BLD AUTO: 1 %
ERYTHROCYTE [DISTWIDTH] IN BLOOD BY AUTOMATED COUNT: 15.4 % (ref 11.7–15.4)
FERRITIN SERPL-MCNC: 29 NG/ML (ref 15–150)
GLUCOSE SERPL-MCNC: 172 MG/DL (ref 70–99)
HCT VFR BLD AUTO: 38.9 % (ref 34–46.6)
HGB BLD-MCNC: 12.3 G/DL (ref 11.1–15.9)
IMM GRANULOCYTES # BLD AUTO: 0 X10E3/UL (ref 0–0.1)
IMM GRANULOCYTES NFR BLD AUTO: 0 %
IRON SATN MFR SERPL: 9 % (ref 15–55)
IRON SERPL-MCNC: 56 UG/DL (ref 27–139)
LYMPHOCYTES # BLD AUTO: 1.8 X10E3/UL (ref 0.7–3.1)
LYMPHOCYTES NFR BLD AUTO: 31 %
MCH RBC QN AUTO: 25.6 PG (ref 26.6–33)
MCHC RBC AUTO-ENTMCNC: 31.6 G/DL (ref 31.5–35.7)
MCV RBC AUTO: 81 FL (ref 79–97)
MONOCYTES # BLD AUTO: 0.7 X10E3/UL (ref 0.1–0.9)
MONOCYTES NFR BLD AUTO: 12 %
NEUTROPHILS # BLD AUTO: 3.1 X10E3/UL (ref 1.4–7)
NEUTROPHILS NFR BLD AUTO: 55 %
PLATELET # BLD AUTO: 240 X10E3/UL (ref 150–450)
POTASSIUM SERPL-SCNC: 4.6 MMOL/L (ref 3.5–5.2)
PROT SERPL-MCNC: 7.7 G/DL (ref 6–8.5)
RBC # BLD AUTO: 4.81 X10E6/UL (ref 3.77–5.28)
SODIUM SERPL-SCNC: 130 MMOL/L (ref 134–144)
TIBC SERPL-MCNC: 613 UG/DL (ref 250–450)
UIBC SERPL-MCNC: 557 UG/DL (ref 118–369)
WBC # BLD AUTO: 5.7 X10E3/UL (ref 3.4–10.8)

## 2023-09-27 LAB
AFP L3 MFR SERPL: 12.8 % (ref 0–9.9)
AFP SERPL-MCNC: 5.5 NG/ML (ref 0–9.2)

## 2024-01-03 RX ORDER — SPIRONOLACTONE 100 MG/1
100 TABLET, FILM COATED ORAL DAILY
Qty: 60 TABLET | Refills: 0 | Status: SHIPPED | OUTPATIENT
Start: 2024-01-03

## 2024-02-12 DIAGNOSIS — K74.60 CIRRHOSIS OF LIVER WITH ASCITES, UNSPECIFIED HEPATIC CIRRHOSIS TYPE (HCC): Primary | ICD-10-CM

## 2024-02-12 DIAGNOSIS — R18.8 CIRRHOSIS OF LIVER WITH ASCITES, UNSPECIFIED HEPATIC CIRRHOSIS TYPE (HCC): Primary | ICD-10-CM

## 2024-02-12 RX ORDER — SPIRONOLACTONE 100 MG/1
100 TABLET, FILM COATED ORAL DAILY
Qty: 60 TABLET | Refills: 0 | Status: SHIPPED | OUTPATIENT
Start: 2024-02-12

## 2024-03-21 ENCOUNTER — TELEPHONE (OUTPATIENT)
Age: 64
End: 2024-03-21

## 2024-03-21 DIAGNOSIS — K74.60 CIRRHOSIS OF LIVER WITH ASCITES, UNSPECIFIED HEPATIC CIRRHOSIS TYPE (HCC): ICD-10-CM

## 2024-03-21 DIAGNOSIS — R18.8 CIRRHOSIS OF LIVER WITH ASCITES, UNSPECIFIED HEPATIC CIRRHOSIS TYPE (HCC): ICD-10-CM

## 2024-03-21 RX ORDER — SPIRONOLACTONE 100 MG/1
100 TABLET, FILM COATED ORAL DAILY
Qty: 90 TABLET | Refills: 3 | Status: SHIPPED | OUTPATIENT
Start: 2024-03-21

## 2024-03-28 ENCOUNTER — HOSPITAL ENCOUNTER (OUTPATIENT)
Facility: HOSPITAL | Age: 64
Setting detail: SPECIMEN
Discharge: HOME | End: 2024-03-28
Payer: MEDICARE

## 2024-03-28 ENCOUNTER — OFFICE VISIT (OUTPATIENT)
Age: 64
End: 2024-03-28
Payer: MEDICARE

## 2024-03-28 VITALS
SYSTOLIC BLOOD PRESSURE: 125 MMHG | HEIGHT: 61 IN | TEMPERATURE: 97.9 F | OXYGEN SATURATION: 97 % | HEART RATE: 64 BPM | DIASTOLIC BLOOD PRESSURE: 76 MMHG | WEIGHT: 156.4 LBS | BODY MASS INDEX: 29.53 KG/M2

## 2024-03-28 DIAGNOSIS — K75.81 NASH (NONALCOHOLIC STEATOHEPATITIS): ICD-10-CM

## 2024-03-28 DIAGNOSIS — K75.81 NASH (NONALCOHOLIC STEATOHEPATITIS): Primary | ICD-10-CM

## 2024-03-28 LAB
ALBUMIN SERPL-MCNC: 4.6 G/DL (ref 3.4–5)
ALBUMIN/GLOB SERPL: 1.2 (ref 0.8–1.7)
ALP SERPL-CCNC: 111 U/L (ref 45–117)
ALT SERPL-CCNC: 75 U/L (ref 13–56)
ANION GAP SERPL CALC-SCNC: 5 MMOL/L (ref 3–18)
AST SERPL-CCNC: 43 U/L (ref 10–38)
BASOPHILS # BLD: 0 K/UL (ref 0–0.1)
BASOPHILS NFR BLD: 1 % (ref 0–2)
BILIRUB DIRECT SERPL-MCNC: 0.2 MG/DL (ref 0–0.2)
BILIRUB SERPL-MCNC: 0.4 MG/DL (ref 0.2–1)
BUN SERPL-MCNC: 26 MG/DL (ref 7–18)
BUN/CREAT SERPL: 20 (ref 12–20)
CALCIUM SERPL-MCNC: 10.8 MG/DL (ref 8.5–10.1)
CHLORIDE SERPL-SCNC: 100 MMOL/L (ref 100–111)
CO2 SERPL-SCNC: 29 MMOL/L (ref 21–32)
CREAT SERPL-MCNC: 1.27 MG/DL (ref 0.6–1.3)
DIFFERENTIAL METHOD BLD: ABNORMAL
EOSINOPHIL # BLD: 0.1 K/UL (ref 0–0.4)
EOSINOPHIL NFR BLD: 1 % (ref 0–5)
ERYTHROCYTE [DISTWIDTH] IN BLOOD BY AUTOMATED COUNT: 14.2 % (ref 11.6–14.5)
FERRITIN SERPL-MCNC: 52 NG/ML (ref 8–388)
GLOBULIN SER CALC-MCNC: 3.9 G/DL (ref 2–4)
GLUCOSE SERPL-MCNC: 109 MG/DL (ref 74–99)
HCT VFR BLD AUTO: 43.2 % (ref 35–45)
HGB BLD-MCNC: 14.5 G/DL (ref 12–16)
IMM GRANULOCYTES # BLD AUTO: 0 K/UL (ref 0–0.04)
IMM GRANULOCYTES NFR BLD AUTO: 0 % (ref 0–0.5)
INR PPP: 1 (ref 0.9–1.1)
IRON SATN MFR SERPL: 11 % (ref 20–50)
IRON SERPL-MCNC: 62 UG/DL (ref 50–175)
LYMPHOCYTES # BLD: 2 K/UL (ref 0.9–3.6)
LYMPHOCYTES NFR BLD: 36 % (ref 21–52)
MCH RBC QN AUTO: 30.6 PG (ref 24–34)
MCHC RBC AUTO-ENTMCNC: 33.6 G/DL (ref 31–37)
MCV RBC AUTO: 91.1 FL (ref 78–100)
MONOCYTES # BLD: 0.6 K/UL (ref 0.05–1.2)
MONOCYTES NFR BLD: 11 % (ref 3–10)
NEUTS SEG # BLD: 2.8 K/UL (ref 1.8–8)
NEUTS SEG NFR BLD: 51 % (ref 40–73)
NRBC # BLD: 0 K/UL (ref 0–0.01)
NRBC BLD-RTO: 0 PER 100 WBC
PLATELET # BLD AUTO: 232 K/UL (ref 135–420)
PMV BLD AUTO: 11.7 FL (ref 9.2–11.8)
POTASSIUM SERPL-SCNC: 4.3 MMOL/L (ref 3.5–5.5)
PROT SERPL-MCNC: 8.5 G/DL (ref 6.4–8.2)
PROTHROMBIN TIME: 13.4 SEC (ref 11.9–14.7)
RBC # BLD AUTO: 4.74 M/UL (ref 4.2–5.3)
SODIUM SERPL-SCNC: 134 MMOL/L (ref 136–145)
TIBC SERPL-MCNC: 577 UG/DL (ref 250–450)
WBC # BLD AUTO: 5.5 K/UL (ref 4.6–13.2)

## 2024-03-28 PROCEDURE — 3078F DIAST BP <80 MM HG: CPT | Performed by: NURSE PRACTITIONER

## 2024-03-28 PROCEDURE — 36415 COLL VENOUS BLD VENIPUNCTURE: CPT

## 2024-03-28 PROCEDURE — 80048 BASIC METABOLIC PNL TOTAL CA: CPT

## 2024-03-28 PROCEDURE — 85610 PROTHROMBIN TIME: CPT

## 2024-03-28 PROCEDURE — 99214 OFFICE O/P EST MOD 30 MIN: CPT | Performed by: NURSE PRACTITIONER

## 2024-03-28 PROCEDURE — 83540 ASSAY OF IRON: CPT

## 2024-03-28 PROCEDURE — 80076 HEPATIC FUNCTION PANEL: CPT

## 2024-03-28 PROCEDURE — 82728 ASSAY OF FERRITIN: CPT

## 2024-03-28 PROCEDURE — 82107 ALPHA-FETOPROTEIN L3: CPT

## 2024-03-28 PROCEDURE — 85025 COMPLETE CBC W/AUTO DIFF WBC: CPT

## 2024-03-28 PROCEDURE — 3074F SYST BP LT 130 MM HG: CPT | Performed by: NURSE PRACTITIONER

## 2024-03-28 PROCEDURE — 1123F ACP DISCUSS/DSCN MKR DOCD: CPT | Performed by: NURSE PRACTITIONER

## 2024-03-28 PROCEDURE — 83550 IRON BINDING TEST: CPT

## 2024-03-28 NOTE — PROGRESS NOTES
Negative Positive (A)   Hep B Surface Ag Negative Negative   Hep B Core Ab, Total Negative Negative   Hep B Surface AB QL  Non Reactive   Hep C Ab 0.0 - 0.9 s/co ratio <0.1   Ferritin 15 - 150 ng/mL 19   Iron % Saturation 15 - 55 % 8 (LL)      Latest Ref Rng 9/25/2023 3/28/2024   GEOVANNY - Serologies      Ferritin 8 - 388 NG/ML 29  52    Iron % Saturation 20 - 50 % 9 (LL)  11 (L)       LIVER HISTOLOGY:  5/2021. FibroScan performed at [facility]. EkPa was 27.8.  IQR/med 32%. . The results suggested a fibrosis level of F4. The CAP score suggests there is hepatic steatosis.      6/2021. Slides reviewed by ROBERTO. CORTES. 33-50% macrovesicualr and micovesicular steatosis, mild inflammation, mild ballooning, Stage 4 fibrosis. SADA (211).    ENDOSCOPIC PROCEDURES:  3/2021. EGD performed by Dr. Saini. No esophageal varices. No gastric varices. No portal gastropathy.  Gastritis.     RADIOLOGY:  5/2022. Dynamic MRI liver. Changes consistent with cirrhosis. No liver mass lesions. No dilated bile ducts. No bile duct strictures. No ascites.    3/2023. Ultrasound of liver. Echogenic consistent with cirrhosis. No liver mass lesions.  No dilated bile ducts.  No ascites.    OTHER TESTING:  Not available or performed    FOLLOW-UP:  All of the issues listed above in the Assessment and Plan were discussed with the patient.  All questions were answered.  The patient expressed a clear understanding of the above.    Follow-up [facility] in 6 months for routine monitoring.      KENAN Viramontes  [facility]  [address], [address]  [place]  [address] [phone]

## 2024-04-01 LAB
AFP L3 MFR SERPL: 12.9 % (ref 0–9.9)
AFP SERPL-MCNC: 7.5 NG/ML (ref 0–9.2)

## 2024-04-19 ENCOUNTER — HOSPITAL ENCOUNTER (OUTPATIENT)
Facility: HOSPITAL | Age: 64
End: 2024-04-19
Payer: MEDICARE

## 2024-04-19 DIAGNOSIS — K75.81 NASH (NONALCOHOLIC STEATOHEPATITIS): ICD-10-CM

## 2024-04-19 PROCEDURE — 76705 ECHO EXAM OF ABDOMEN: CPT

## 2024-05-14 ENCOUNTER — TRANSCRIBE ORDERS (OUTPATIENT)
Facility: HOSPITAL | Age: 64
End: 2024-05-14

## 2024-05-14 DIAGNOSIS — R13.12 OROPHARYNGEAL DYSPHAGIA: Primary | ICD-10-CM

## 2024-05-22 ENCOUNTER — HOSPITAL ENCOUNTER (OUTPATIENT)
Facility: HOSPITAL | Age: 64
Discharge: HOME | End: 2024-05-25
Attending: OTOLARYNGOLOGY
Payer: MEDICARE

## 2024-05-22 DIAGNOSIS — R13.10 DYSPHAGIA, UNSPECIFIED TYPE: ICD-10-CM

## 2024-05-22 DIAGNOSIS — R07.0 PAIN IN THROAT: ICD-10-CM

## 2024-05-22 PROCEDURE — 76536 US EXAM OF HEAD AND NECK: CPT

## 2024-08-06 ENCOUNTER — OFFICE VISIT (OUTPATIENT)
Age: 64
End: 2024-08-06
Payer: MEDICARE

## 2024-08-06 ENCOUNTER — HOSPITAL ENCOUNTER (OUTPATIENT)
Facility: HOSPITAL | Age: 64
Setting detail: SPECIMEN
Discharge: HOME | End: 2024-08-09

## 2024-08-06 VITALS
HEART RATE: 73 BPM | WEIGHT: 152.8 LBS | SYSTOLIC BLOOD PRESSURE: 109 MMHG | HEIGHT: 61 IN | OXYGEN SATURATION: 99 % | DIASTOLIC BLOOD PRESSURE: 62 MMHG | TEMPERATURE: 96.6 F | BODY MASS INDEX: 28.85 KG/M2

## 2024-08-06 DIAGNOSIS — K74.60 HEPATIC CIRRHOSIS, UNSPECIFIED HEPATIC CIRRHOSIS TYPE, UNSPECIFIED WHETHER ASCITES PRESENT (HCC): Primary | ICD-10-CM

## 2024-08-06 DIAGNOSIS — K74.60 HEPATIC CIRRHOSIS, UNSPECIFIED HEPATIC CIRRHOSIS TYPE, UNSPECIFIED WHETHER ASCITES PRESENT (HCC): ICD-10-CM

## 2024-08-06 LAB — SENTARA SPECIMEN COLLECTION: NORMAL

## 2024-08-06 PROCEDURE — 1123F ACP DISCUSS/DSCN MKR DOCD: CPT | Performed by: NURSE PRACTITIONER

## 2024-08-06 PROCEDURE — 99214 OFFICE O/P EST MOD 30 MIN: CPT | Performed by: NURSE PRACTITIONER

## 2024-08-06 PROCEDURE — 3074F SYST BP LT 130 MM HG: CPT | Performed by: NURSE PRACTITIONER

## 2024-08-06 PROCEDURE — 3078F DIAST BP <80 MM HG: CPT | Performed by: NURSE PRACTITIONER

## 2024-08-06 PROCEDURE — 99001 SPECIMEN HANDLING PT-LAB: CPT

## 2024-08-06 RX ORDER — CALCIUM CITRATE/VITAMIN D3 200MG-6.25
TABLET ORAL
COMMUNITY
Start: 2024-06-28

## 2024-08-06 RX ORDER — OXYBUTYNIN CHLORIDE 10 MG/1
10 TABLET, EXTENDED RELEASE ORAL DAILY
COMMUNITY
Start: 2024-06-02 | End: 2024-08-06

## 2024-08-06 RX ORDER — VALACYCLOVIR HYDROCHLORIDE 500 MG/1
1000 TABLET, FILM COATED ORAL
COMMUNITY
Start: 2024-02-23

## 2024-08-06 RX ORDER — EMPAGLIFLOZIN 25 MG/1
25 TABLET, FILM COATED ORAL
COMMUNITY
Start: 2024-02-01

## 2024-08-06 RX ORDER — EPINEPHRINE 0.3 MG/.3ML
0.3 INJECTION SUBCUTANEOUS PRN
COMMUNITY
Start: 2020-07-07

## 2024-08-06 RX ORDER — BACLOFEN 10 MG/1
TABLET ORAL
COMMUNITY
Start: 2024-08-05

## 2024-08-06 RX ORDER — INSULIN DEGLUDEC 200 U/ML
72 INJECTION, SOLUTION SUBCUTANEOUS NIGHTLY
COMMUNITY

## 2024-08-06 RX ORDER — INSULIN LISPRO 100 [IU]/ML
INJECTION, SOLUTION INTRAVENOUS; SUBCUTANEOUS
COMMUNITY
Start: 2024-05-13

## 2024-08-06 RX ORDER — ZONISAMIDE 100 MG/1
CAPSULE ORAL
COMMUNITY

## 2024-08-06 RX ORDER — CLOBETASOL PROPIONATE 0.5 MG/G
OINTMENT TOPICAL
COMMUNITY

## 2024-08-06 RX ORDER — METRONIDAZOLE 250 MG/1
TABLET ORAL
COMMUNITY
Start: 2024-07-21

## 2024-08-06 RX ORDER — TEMAZEPAM 15 MG/1
CAPSULE ORAL
COMMUNITY
End: 2024-08-06

## 2024-08-06 RX ORDER — OXYCODONE HYDROCHLORIDE AND ACETAMINOPHEN 5; 325 MG/1; MG/1
1 TABLET ORAL EVERY 8 HOURS PRN
COMMUNITY
Start: 2024-06-09 | End: 2024-08-06

## 2024-08-06 RX ORDER — PANCRELIPASE 24000; 76000; 120000 [USP'U]/1; [USP'U]/1; [USP'U]/1
CAPSULE, DELAYED RELEASE PELLETS ORAL
COMMUNITY
Start: 2022-05-05 | End: 2024-08-06

## 2024-08-06 RX ORDER — AZATHIOPRINE 50 MG/1
50 TABLET ORAL DAILY
COMMUNITY
Start: 2024-05-23

## 2024-08-06 RX ORDER — INSULIN LISPRO 100 [IU]/ML
25 INJECTION, SUSPENSION SUBCUTANEOUS 3 TIMES DAILY
COMMUNITY
Start: 2024-02-21

## 2024-08-06 RX ORDER — METHOCARBAMOL 750 MG/1
750 TABLET, FILM COATED ORAL EVERY 4 HOURS PRN
COMMUNITY
Start: 2024-06-09

## 2024-08-06 RX ORDER — OMEPRAZOLE 40 MG/1
CAPSULE, DELAYED RELEASE ORAL
COMMUNITY

## 2024-08-06 RX ORDER — FERROUS SULFATE 325(65) MG
650 TABLET, DELAYED RELEASE (ENTERIC COATED) ORAL
COMMUNITY

## 2024-08-06 RX ORDER — ALPRAZOLAM 0.5 MG/1
0.5 TABLET ORAL 2 TIMES DAILY PRN
COMMUNITY
Start: 2023-12-01

## 2024-08-06 RX ORDER — PHENAZOPYRIDINE HYDROCHLORIDE 200 MG/1
200 TABLET, FILM COATED ORAL 3 TIMES DAILY PRN
COMMUNITY
Start: 2022-06-22 | End: 2024-08-06

## 2024-08-06 RX ORDER — TRIAMCINOLONE ACETONIDE 1 MG/G
OINTMENT TOPICAL 3 TIMES DAILY
COMMUNITY
Start: 2024-03-06

## 2024-08-06 RX ORDER — HYDROCHLOROTHIAZIDE 12.5 MG/1
12.5 TABLET ORAL DAILY
COMMUNITY
Start: 2022-04-14 | End: 2024-08-06

## 2024-08-06 RX ORDER — LIDOCAINE 50 MG/G
1 PATCH TOPICAL DAILY
COMMUNITY
Start: 2022-04-12

## 2024-08-06 RX ORDER — TEMAZEPAM 7.5 MG/1
CAPSULE ORAL
COMMUNITY
Start: 2024-07-10

## 2024-08-06 RX ORDER — DULOXETIN HYDROCHLORIDE 60 MG/1
60 CAPSULE, DELAYED RELEASE ORAL DAILY
COMMUNITY
Start: 2022-04-15

## 2024-08-06 RX ORDER — PANCRELIPASE 36000; 180000; 114000 [USP'U]/1; [USP'U]/1; [USP'U]/1
1 CAPSULE, DELAYED RELEASE PELLETS ORAL
COMMUNITY

## 2024-08-06 RX ORDER — MECLIZINE HYDROCHLORIDE 25 MG/1
25 TABLET ORAL
COMMUNITY
Start: 2024-01-16

## 2024-08-06 RX ORDER — CETIRIZINE HYDROCHLORIDE 10 MG/1
20 TABLET ORAL DAILY
COMMUNITY

## 2024-08-06 RX ORDER — DOXYCYCLINE 100 MG/1
100 CAPSULE ORAL 2 TIMES DAILY
COMMUNITY
Start: 2024-04-30

## 2024-08-06 RX ORDER — MELOXICAM 7.5 MG/1
7.5 TABLET ORAL 2 TIMES DAILY
COMMUNITY
Start: 2024-05-09

## 2024-08-06 RX ORDER — TRIAMCINOLONE ACETONIDE 1 MG/G
CREAM TOPICAL
COMMUNITY

## 2024-08-06 RX ORDER — HYDROXYCHLOROQUINE SULFATE 200 MG/1
200 TABLET, FILM COATED ORAL DAILY
COMMUNITY
End: 2024-08-06

## 2024-08-06 RX ORDER — CEPHALEXIN 500 MG/1
500 CAPSULE ORAL 3 TIMES DAILY
COMMUNITY
Start: 2022-06-22

## 2024-08-06 NOTE — PROGRESS NOTES
[facility]      Cy Ballesteros MD, FACP, FACG, FAASLD      MARK Smith, [facility]   Jeanette Antonio, [facility]   Nayeli Jane, FNP-C  Miguel Rodriguez, [facility]-C   Eve Alvarez, [facility]   at [facility]   [address], [address]   [place]  [address] [phone]   FAX: [phone]  [facility]   [address], [address]   [place]  [address] [phone]   FAX: [phone]     Patient Care Team:  Mariya Demarco MD as PCP - General      Patient Active Problem List   Diagnosis    CORTES (nonalcoholic steatohepatitis)    Cirrhosis (HCC)     Jeanine Saini is being seen at [facility] for management of cirrhosis secondary to non-alcoholic steatohepatitis (CORTES).  The active problem list, all pertinent past medical history, medications, liver histology, radiologic findings and laboratory findings related to the liver disorder were reviewed and discussed with the patient.      The patient is a 64 y.o. female who was found to have chronic liver disease and cirrhosis in 2/2021 when she had a CT scan.    The patient underwent a liver biopsy in 6/2021. This demonstrates CORTES with cirrhosis.    The most recent imaging of the liver was Ultrasound performed in 4/2024. Results suggest cirrhosis. No liver mass lesions noted.    The patient has not developed any of the major complications of cirrhosis to date.    The patient denies having any symptoms which could be attributed to the liver disorder.    The patient is not experiencing the following symptoms which are commonly seen in this liver disorder: fatigue, pain in the right side over the liver, problems concentrating.     The patient completes all daily activities without any functional       ASSESSMENT AND

## 2024-08-07 LAB
A/G RATIO: 1.4 RATIO (ref 1.1–2.6)
ALBUMIN: 4.6 G/DL (ref 3.5–5)
ALP BLD-CCNC: 74 U/L (ref 40–120)
ALT SERPL-CCNC: 29 U/L (ref 5–40)
ANION GAP SERPL CALCULATED.3IONS-SCNC: 14 MMOL/L (ref 3–15)
AST SERPL-CCNC: 28 U/L (ref 10–37)
BASOPHILS ABSOLUTE: 0 K/UL (ref 0–0.2)
BASOPHILS RELATIVE PERCENT: 1 % (ref 0–2)
BILIRUB SERPL-MCNC: 0.4 MG/DL (ref 0.2–1.2)
BILIRUBIN DIRECT: <0.2 MG/DL (ref 0–0.3)
BUN BLDV-MCNC: 31 MG/DL (ref 6–22)
CALCIUM SERPL-MCNC: 11 MG/DL (ref 8.4–10.5)
CHLORIDE BLD-SCNC: 90 MMOL/L (ref 98–110)
CO2: 27 MMOL/L (ref 20–32)
CREAT SERPL-MCNC: 1.2 MG/DL (ref 0.8–1.4)
EOSINOPHIL # BLD: 1 % (ref 0–6)
EOSINOPHILS ABSOLUTE: 0 K/UL (ref 0–0.5)
GFR, ESTIMATED: 48.3 ML/MIN/1.73 SQ.M.
GLOBULIN: 3.2 G/DL (ref 2–4)
GLUCOSE: 375 MG/DL (ref 70–99)
HCT VFR BLD CALC: 43.4 % (ref 35.1–48.3)
HEMOGLOBIN: 13.8 G/DL (ref 11.7–16.1)
INR BLD: 0.98 (ref 0.89–1.29)
LYMPHOCYTES # BLD: 35 % (ref 20–45)
LYMPHOCYTES ABSOLUTE: 2 K/UL (ref 1–4.8)
MCH RBC QN AUTO: 30 PG (ref 26–34)
MCHC RBC AUTO-ENTMCNC: 32 G/DL (ref 31–36)
MCV RBC AUTO: 94 FL (ref 80–99)
MONOCYTES ABSOLUTE: 0.6 K/UL (ref 0.1–1)
MONOCYTES: 11 % (ref 3–12)
NEUTROPHILS ABSOLUTE: 3 K/UL (ref 1.8–7.7)
NEUTROPHILS: 53 % (ref 40–75)
PDW BLD-RTO: 14.4 % (ref 10–15.5)
PLATELET # BLD: 220 K/UL (ref 140–440)
PMV BLD AUTO: 12.7 FL (ref 9–13)
POTASSIUM SERPL-SCNC: 5 MMOL/L (ref 3.5–5.5)
PROTHROMBIN TIME: 10.5 SEC (ref 9–13)
RBC # BLD: 4.6 M/UL (ref 3.8–5.2)
SODIUM BLD-SCNC: 131 MMOL/L (ref 133–145)
TOTAL PROTEIN: 7.8 G/DL (ref 6.2–8.1)
WBC # BLD: 5.8 K/UL (ref 4–11)

## 2024-11-05 ENCOUNTER — HOSPITAL ENCOUNTER (OUTPATIENT)
Facility: HOSPITAL | Age: 64
Discharge: HOME | End: 2024-11-08
Payer: MEDICARE

## 2024-11-05 ENCOUNTER — HOSPITAL ENCOUNTER (OUTPATIENT)
Facility: HOSPITAL | Age: 64
Setting detail: SPECIMEN
Discharge: HOME | End: 2024-11-08

## 2024-11-05 ENCOUNTER — TRANSCRIBE ORDERS (OUTPATIENT)
Facility: HOSPITAL | Age: 64
End: 2024-11-05

## 2024-11-05 DIAGNOSIS — L93.2 CUTANEOUS LUPUS ERYTHEMATOSUS: ICD-10-CM

## 2024-11-05 DIAGNOSIS — R13.10 DYSPHAGIA, UNSPECIFIED TYPE: ICD-10-CM

## 2024-11-05 DIAGNOSIS — R07.0 PAIN IN THROAT: ICD-10-CM

## 2024-11-05 DIAGNOSIS — L93.2 CUTANEOUS LUPUS ERYTHEMATOSUS: Primary | ICD-10-CM

## 2024-11-05 DIAGNOSIS — K74.60 HEPATIC CIRRHOSIS, UNSPECIFIED HEPATIC CIRRHOSIS TYPE, UNSPECIFIED WHETHER ASCITES PRESENT (HCC): ICD-10-CM

## 2024-11-05 DIAGNOSIS — R13.12 OROPHARYNGEAL DYSPHAGIA: ICD-10-CM

## 2024-11-05 LAB — SENTARA SPECIMEN COLLECTION: NORMAL

## 2024-11-05 PROCEDURE — 99001 SPECIMEN HANDLING PT-LAB: CPT

## 2024-11-05 PROCEDURE — 76705 ECHO EXAM OF ABDOMEN: CPT

## 2024-11-07 ENCOUNTER — OFFICE VISIT (OUTPATIENT)
Age: 64
End: 2024-11-07
Payer: MEDICARE

## 2024-11-07 VITALS
WEIGHT: 153 LBS | TEMPERATURE: 97.2 F | BODY MASS INDEX: 28.89 KG/M2 | SYSTOLIC BLOOD PRESSURE: 113 MMHG | HEART RATE: 69 BPM | OXYGEN SATURATION: 98 % | DIASTOLIC BLOOD PRESSURE: 61 MMHG | HEIGHT: 61 IN

## 2024-11-07 DIAGNOSIS — K75.81 NASH (NONALCOHOLIC STEATOHEPATITIS): Primary | ICD-10-CM

## 2024-11-07 DIAGNOSIS — K74.60 HEPATIC CIRRHOSIS, UNSPECIFIED HEPATIC CIRRHOSIS TYPE, UNSPECIFIED WHETHER ASCITES PRESENT (HCC): ICD-10-CM

## 2024-11-07 PROCEDURE — 3074F SYST BP LT 130 MM HG: CPT | Performed by: NURSE PRACTITIONER

## 2024-11-07 PROCEDURE — 1123F ACP DISCUSS/DSCN MKR DOCD: CPT | Performed by: NURSE PRACTITIONER

## 2024-11-07 PROCEDURE — 99214 OFFICE O/P EST MOD 30 MIN: CPT | Performed by: NURSE PRACTITIONER

## 2024-11-07 PROCEDURE — 3078F DIAST BP <80 MM HG: CPT | Performed by: NURSE PRACTITIONER

## 2024-11-07 RX ORDER — HYDROXYZINE HYDROCHLORIDE 25 MG/1
TABLET, FILM COATED ORAL
COMMUNITY
Start: 2024-11-04

## 2024-11-07 NOTE — PROGRESS NOTES
15 - 150 ng/mL 19   Iron % Saturation 15 - 55 % 8 (LL)      Latest Ref Rng 9/25/2023 3/28/2024   GEOVANNY - Serologies      Ferritin 8 - 388 NG/ML 29  52    Iron % Saturation 20 - 50 % 9 (LL)  11 (L)       LIVER HISTOLOGY:  5/2021. FibroScan performed at [facility]. EkPa was 27.8.  IQR/med 32%. . The results suggested a fibrosis level of F4. The CAP score suggests there is hepatic steatosis.      6/2021. Slides reviewed by MLS. CORTES. 33-50% macrovesicualr and micovesicular steatosis, mild inflammation, mild ballooning, Stage 4 fibrosis. SADA (211).    ENDOSCOPIC PROCEDURES:  3/2021. EGD performed by Dr. Saini. No esophageal varices. No gastric varices. No portal gastropathy. Gastritis.     RADIOLOGY:  5/2022. Dynamic MRI liver. Changes consistent with cirrhosis. No liver mass lesions. No dilated bile ducts. No bile duct strictures. No ascites.    3/2023. Ultrasound of liver. Echogenic consistent with cirrhosis. No liver mass lesions.  No dilated bile ducts.  No ascites.    4/2024. Ultrasound of liver. Echogenic consistent with cirrhosis. No liver mass lesions. No dilated bile ducts. No ascites.    OTHER TESTING:  Not available or performed    FOLLOW-UP:  All of the issues listed above in the Assessment and Plan were discussed with the patient.  All questions were answered.  The patient expressed a clear understanding of the above.    Follow-up [facility] in 6 months for routine monitoring which may include Fibroscan.      HECTOR Viramontes-BC  [facility]  [address], [address]  [place]  [address] [phone]

## 2025-02-25 ENCOUNTER — TELEPHONE (OUTPATIENT)
Age: 65
End: 2025-02-25

## 2025-02-25 NOTE — TELEPHONE ENCOUNTER
LVM stating pt apt was cancelled due to the provider being out of office and to call back to get rescheduled.

## 2025-06-13 ENCOUNTER — HOSPITAL ENCOUNTER (OUTPATIENT)
Facility: HOSPITAL | Age: 65
Setting detail: RECURRING SERIES
Discharge: HOME | End: 2025-06-16
Payer: MEDICARE

## 2025-06-13 PROCEDURE — 97166 OT EVAL MOD COMPLEX 45 MIN: CPT

## 2025-06-13 NOTE — PROGRESS NOTES
[facility] [place] [address]  Ph [phone]  Fx [phone]    Plan of Care/Statement of Necessity for Occupational Therapy Services        Patient name: Jeanine Saini Start of Care: 2025   Referral source: Jerad Peters MD : 1960    Medical Diagnosis: Trigger finger, right little finger  Trigger finger, right ring finger   Onset Date:6/10/2025    Treatment Diagnosis: M79.641  Pain in right hand     Prior Hospitalization: see medical history Provider#: [id number]     Comorbidities: Diabetes mellitus and Musculoskeletal disorders hx of Carpal tunnel release on both hands     Prior Level of Function: functionally independent, no AD, right handed     The Plan of Care and following information is based on the information from the initial evaluation.  Assessment/ key information: Pt is a 64 yo female who presents to occupational therapy with pain and decrease functional hand use of right hand. Pt is s/p trigger finger release of middle, ring, and little fingers on 6/10/2025. Pt arrived to OT with 8/10 accompanied with her . Pt had post-op dressing on, OT undressed dressing, appears to have bruising on the palm with 7 stiches intact on palm proximal to the base of the MF, RF, and LF, no signs of infection and no drainage. Covered with Xeroform gauze and wrapped in sterile gauze. Provided stockinet for compression. Pt demonstrate decrease ROM, pain, swelling, decrease strength, and overall, decrease functional hand use. Educated pt on wound healing process as she is a diabetic. Educated pt on modalities and desensitization techniques to prevent any increase sensitivity on the surgical area. Educated on wrist and digits AROM, blocking and tendon glides exercises, provided demo and handout. Pt able to return demo. Patient will benefit from skilled OT to address these deficits, adhere to post-op treatment protocol, and return participation of

## 2025-06-13 NOTE — PROGRESS NOTES
OT DAILY TREATMENT NOTE/HAND EVAL 10-18    Patient Name: Jeanine Saini    Date: 2025    : 1960  Insurance: Payor: [employer] / Plan: [employer] CSNP / Product Type: *No Product type* /      Patient  verified yes     Visit #   Current / Total 1 8   Time   In / Out 9:14 10:00     TREATMENT AREA =  Trigger finger, right middle finger [M65.331]  Trigger finger, right little finger [M65.351]  Trigger finger, right ring finger [M65.341]      SUBJECTIVE  Pain Level (0-10 scale): 8/10  []constant [x]intermittent []improving []worsening []no change since onset    Any medication changes, allergies to medications, adverse drug reactions, diagnosis change, or new procedure performed?: [x] No    [] Yes   Subjective functional status/changes:     PLOF: functionally independent, no AD, right handed  Limitations to PLOF: decrease ROM, decrease strength, pain, swelling, decrease functional hand use of right hand  Mechanism of Injury: Pt reports symptoms of locking started about about 4 months ago, started with middle finger, then progressed through RF and SF. Pt is s/p   Current symptoms/Complaints: 6/10 at best with rest/medication/ice pack; 10/10 at worst with movement; pt reports a little bit of tingling and that pain symptoms is affecting sleep  Previous Treatment/Compliance: wearing brace / splint  PMHx/Surgical Hx: Diabetes mellitus and Musculoskeletal disorders hx of Carpal tunnel release on both hands   Work Hx: Works as an [employer] . Outside of work pt enjoys walking the dog, writing  Living Situation: Lives with   Pt Goals: \"get rid of the pain and use my hand\"  Barriers: [x]pain []financial []time []transportation []other  Motivation: Pt is motivated  Substance use: []Alcohol []Tobacco []other:   Cognition: A & O x 3        OBJECTIVE    46 min [x]Eval - untimed                      General Evaluation  EDEMA RIGHT LEFT   Biceps (___cm proximal to elbow crease)     Forearm

## 2025-06-25 ENCOUNTER — TELEPHONE (OUTPATIENT)
Facility: HOSPITAL | Age: 65
End: 2025-06-25

## 2025-06-27 ENCOUNTER — APPOINTMENT (OUTPATIENT)
Facility: HOSPITAL | Age: 65
End: 2025-06-27
Payer: MEDICARE

## 2025-07-01 ENCOUNTER — HOSPITAL ENCOUNTER (OUTPATIENT)
Facility: HOSPITAL | Age: 65
Setting detail: RECURRING SERIES
Discharge: HOME | End: 2025-07-04
Payer: MEDICARE

## 2025-07-01 PROCEDURE — 97110 THERAPEUTIC EXERCISES: CPT

## 2025-07-01 PROCEDURE — 97535 SELF CARE MNGMENT TRAINING: CPT

## 2025-07-01 NOTE — PROGRESS NOTES
restrictions, analyze and cue for proper movement patterns, and analyze and modify for postural abnormalities to address functional deficits and attain remaining goals.    Progress toward goals / Updated goals:  []  See Progress Note/Recertification    Short Term Goals: To be accomplished in 4 treatments:  Patient will be independent and compliant with HEP to progress toward goals and restore functional hand use.  Eval Status: issued at [employer]  [email or web address] Access Code: [email or web address]     Patient will improve digits WHALEN to over 220 deg to improve functional grasp to hold the steering wheel to return to goals of driving.   Eval Status:   RIGHT HAND     IF MF RF LF   MCP 50 50 45 40   PIP 80 80 85 50   DIP 55 55 55 55   WHALEN 185 185 185 145      7/1: GOAL MET  RIGHT HAND     IF MF RF LF   MCP 90 80 80 80   PIP 95 95 95 95   DIP 65 65 65 65   WHALEN 250 240 240 240        Patient will be able to perform fine motor activity with small objects in hand, 8-10 objects in hand without dropping to promote fine motor and dexterity and return to goals of handwriting.  Eval Status: unable to write/hold with her right hand  7/1: Pt able to /hold 13 marbles with no drops - GOAL MET     Long Term Goals: To be accomplished in 8 treatments:  Patient will improve DASH score by less than 20% to improve functional tolerance for ADL/IADL tolerance.  Eval Status: DASH  97.73%  7/1: 13.64% - GOAL MET     Pt will be able to hold and carry 5-10# kettlebell in supination and pronation to improve functional grasp to hold a dog leash to return to goals of walking her dogs.   Eval Status: NT, post-op  7/1: NT - pt reports holding/carrying a gallon of milk, which is about 8# - progressing       Patient will improve right  strength to match unaffected hand to improve ADL/IADL tolerance and restore prior level of function.  Eval Status:  strength, NT - post op  7/1: Right: 10# - progressing     Left:

## 2025-07-08 ENCOUNTER — APPOINTMENT (OUTPATIENT)
Facility: HOSPITAL | Age: 65
End: 2025-07-08
Payer: MEDICARE

## 2025-07-21 ENCOUNTER — APPOINTMENT (OUTPATIENT)
Facility: HOSPITAL | Age: 65
End: 2025-07-21
Payer: MEDICARE

## 2025-07-28 ENCOUNTER — APPOINTMENT (OUTPATIENT)
Facility: HOSPITAL | Age: 65
End: 2025-07-28
Payer: MEDICARE

## (undated) DEVICE — BANDAGE,GAUZE,BULKEE II,4.5"X4.1YD,STRL: Brand: MEDLINE

## (undated) DEVICE — JACKSON TABLE POSITIONER KIT: Brand: MEDLINE INDUSTRIES, INC.

## (undated) DEVICE — SUT MONOCRYL PLUS UD 4-0 --

## (undated) DEVICE — SUT VCRL + 2-0 36IN CT1 UD --

## (undated) DEVICE — 5.0MM ROUND FLUTED

## (undated) DEVICE — HYPODERMIC SAFETY NEEDLE: Brand: MAGELLAN

## (undated) DEVICE — PAD NON-ADHERENT 3X4 STRL LF --

## (undated) DEVICE — DRAPE C-ARMOUR C-ARM KIT --

## (undated) DEVICE — STERILE POLYISOPRENE POWDER-FREE SURGICAL GLOVES: Brand: PROTEXIS

## (undated) DEVICE — TRAY PREP DRY W/ PREM GLV 2 APPL 6 SPNG 2 UNDPD 1 OVERWRAP

## (undated) DEVICE — STERILE POLYISOPRENE POWDER-FREE SURGICAL GLOVES WITH EMOLLIENT COATING: Brand: PROTEXIS

## (undated) DEVICE — SUT VCRL + 1 36IN CT1 VIO --

## (undated) DEVICE — Device

## (undated) DEVICE — TOWEL,OR,DSP,ST,BLUE,STD,4/PK,20PK/CS: Brand: MEDLINE

## (undated) DEVICE — CATH IV AUTOGRD ORN 14GA 45MM -- INSYTE-N

## (undated) DEVICE — SOL IRRIGATION INJ NACL 0.9% 500ML BTL

## (undated) DEVICE — 1010 S-DRAPE TOWEL DRAPE 10/BX: Brand: STERI-DRAPE™

## (undated) DEVICE — REM POLYHESIVE ADULT PATIENT RETURN ELECTRODE: Brand: VALLEYLAB

## (undated) DEVICE — SYSTEM SKIN CLSR 22CM DERMBND PRINEO

## (undated) DEVICE — PACK PROCEDURE SURG LAMINECTOMY SPINE CUST

## (undated) DEVICE — DISPOSABLE HARDY BAYONET INSULATED BIPOLAR FORCEPS: Brand: INTEGRA® JARIT®

## (undated) DEVICE — SEALANT HEMOSTAT W/THROM 8ML -- SURGIFLO MATRIX

## (undated) DEVICE — 5.0MM FINE DIAMOND

## (undated) DEVICE — DRAPE,APERTURE,MINOR PROCEDURE: Brand: MEDLINE

## (undated) DEVICE — 3M™ IOBAN™ 2 ANTIMICROBIAL INCISE DRAPE 6650EZ: Brand: IOBAN™ 2

## (undated) DEVICE — PREP SKN CHLRAPRP APL 26ML STR --

## (undated) DEVICE — SKIN MARKER,REGULAR TIP WITH RULER AND LABELS: Brand: DEVON

## (undated) DEVICE — SYR 5ML 1/5 GRAD LL NSAF LF --

## (undated) DEVICE — INTENDED FOR TISSUE SEPARATION, AND OTHER PROCEDURES THAT REQUIRE A SHARP SURGICAL BLADE TO PUNCTURE OR CUT.: Brand: BARD-PARKER ®  SAFETY SCALPED

## (undated) DEVICE — PACKING 8004003 NEURAY 200PK 25X25MM: Brand: NEURAY ®

## (undated) DEVICE — GARMENT,MEDLINE,DVT,INT,CALF,MED, GEN2: Brand: MEDLINE

## (undated) DEVICE — DRAPE,SPLIT ,77X120: Brand: MEDLINE

## (undated) DEVICE — ROUND DISSECTORS: Brand: DEROYAL

## (undated) DEVICE — NDL SPNE QNCKE 18GX3.5IN LF --

## (undated) DEVICE — MAJ-1414 SINGLE USE ADPATER BIOPSY VALV: Brand: SINGLE USE ADAPTOR BIOPSY VALVE

## (undated) DEVICE — MAX-CORE® DISPOSABLE CORE BIOPSY INSTRUMENT, 16G X 16CM: Brand: MAX-CORE

## (undated) DEVICE — SOLUTION SCRB 4OZ 4% CHG CLN BASE FOR PT SKIN ANTISEPSIS

## (undated) DEVICE — SPONGE GZ W4XL4IN COT 12 PLY TYP VII WVN C FLD DSGN

## (undated) DEVICE — (D)BNDG ADHESIVE FABRIC 3/4X3 -- DISC BY MFR USE ITEM 357960

## (undated) DEVICE — TUBING, SUCTION, 1/4" X 12', STRAIGHT: Brand: MEDLINE

## (undated) DEVICE — (D)SYR 10ML 1/5ML GRAD NSAF -- PKGING CHANGE USE ITEM 338027

## (undated) DEVICE — KENDALL RADIOLUCENT FOAM MONITORING ELECTRODE RECTANGULAR SHAPE: Brand: KENDALL

## (undated) DEVICE — MAYO STAND COVER: Brand: CONVERTORS

## (undated) DEVICE — FLOSEAL MATRIX IS INDICATED IN SURGICAL PROCEDURES (OTHER THAN IN OPHTHALMIC) AS AN ADJUNCT TO HEMOSTASIS WHEN CONTROL OF BLEEDING BY LIGATURE OR CONVENTIONALPROCEDURES IS INEFFECTIVE OR IMPRACTICAL.: Brand: FLOSEAL HEMOSTATIC MATRIX